# Patient Record
Sex: FEMALE | Race: WHITE | Employment: OTHER | URBAN - METROPOLITAN AREA
[De-identification: names, ages, dates, MRNs, and addresses within clinical notes are randomized per-mention and may not be internally consistent; named-entity substitution may affect disease eponyms.]

---

## 2020-04-02 ENCOUNTER — HOSPITAL ENCOUNTER (INPATIENT)
Facility: CLINIC | Age: 85
LOS: 2 days | Discharge: NURSING, REHAB OR LONG-TERM CARE | DRG: 039 | End: 2020-04-04
Attending: INTERNAL MEDICINE | Admitting: RADIOLOGY
Payer: MEDICARE

## 2020-04-02 ENCOUNTER — ANESTHESIA EVENT (OUTPATIENT)
Dept: SURGERY | Facility: CLINIC | Age: 85
DRG: 039 | End: 2020-04-02
Payer: MEDICARE

## 2020-04-02 ENCOUNTER — APPOINTMENT (OUTPATIENT)
Dept: INTERVENTIONAL RADIOLOGY/VASCULAR | Facility: CLINIC | Age: 85
DRG: 039 | End: 2020-04-02
Payer: MEDICARE

## 2020-04-02 ENCOUNTER — APPOINTMENT (OUTPATIENT)
Dept: GENERAL RADIOLOGY | Facility: CLINIC | Age: 85
DRG: 039 | End: 2020-04-02
Attending: INTERNAL MEDICINE
Payer: MEDICARE

## 2020-04-02 ENCOUNTER — ANESTHESIA (OUTPATIENT)
Dept: SURGERY | Facility: CLINIC | Age: 85
DRG: 039 | End: 2020-04-02
Payer: MEDICARE

## 2020-04-02 DIAGNOSIS — I67.1 DURAL ARTERIOVENOUS FISTULA: ICD-10-CM

## 2020-04-02 DIAGNOSIS — H57.12 LEFT EYE PAIN: ICD-10-CM

## 2020-04-02 DIAGNOSIS — I44.1 AV BLOCK, MOBITZ 2: ICD-10-CM

## 2020-04-02 DIAGNOSIS — I67.1: Primary | ICD-10-CM

## 2020-04-02 LAB
ABO + RH BLD: NORMAL
ABO + RH BLD: NORMAL
ALBUMIN SERPL-MCNC: 3.3 G/DL (ref 3.4–5)
ALP SERPL-CCNC: 69 U/L (ref 40–150)
ALT SERPL W P-5'-P-CCNC: 27 U/L (ref 0–50)
ANION GAP SERPL CALCULATED.3IONS-SCNC: 3 MMOL/L (ref 3–14)
APTT PPP: 30 SEC (ref 22–37)
APTT PPP: 68 SEC (ref 22–37)
APTT PPP: NORMAL SEC (ref 22–37)
AST SERPL W P-5'-P-CCNC: 33 U/L (ref 0–45)
BASOPHILS # BLD AUTO: 0 10E9/L (ref 0–0.2)
BASOPHILS NFR BLD AUTO: 0.4 %
BILIRUB SERPL-MCNC: 0.5 MG/DL (ref 0.2–1.3)
BLD GP AB SCN SERPL QL: NORMAL
BLOOD BANK CMNT PATIENT-IMP: NORMAL
BUN SERPL-MCNC: 23 MG/DL (ref 7–30)
CALCIUM SERPL-MCNC: 10 MG/DL (ref 8.5–10.1)
CHLORIDE SERPL-SCNC: 106 MMOL/L (ref 94–109)
CO2 SERPL-SCNC: 29 MMOL/L (ref 20–32)
CREAT SERPL-MCNC: 0.62 MG/DL (ref 0.52–1.04)
CREAT SERPL-MCNC: 0.62 MG/DL (ref 0.52–1.04)
DIFFERENTIAL METHOD BLD: NORMAL
EOSINOPHIL # BLD AUTO: 0.1 10E9/L (ref 0–0.7)
EOSINOPHIL NFR BLD AUTO: 1.4 %
ERYTHROCYTE [DISTWIDTH] IN BLOOD BY AUTOMATED COUNT: 13.2 % (ref 10–15)
ERYTHROCYTE [DISTWIDTH] IN BLOOD BY AUTOMATED COUNT: 13.5 % (ref 10–15)
GFR SERPL CREATININE-BSD FRML MDRD: 78 ML/MIN/{1.73_M2}
GFR SERPL CREATININE-BSD FRML MDRD: 78 ML/MIN/{1.73_M2}
GLUCOSE BLDC GLUCOMTR-MCNC: 110 MG/DL (ref 70–99)
GLUCOSE BLDC GLUCOMTR-MCNC: 123 MG/DL (ref 70–99)
GLUCOSE SERPL-MCNC: 107 MG/DL (ref 70–99)
HCT VFR BLD AUTO: 35.9 % (ref 35–47)
HCT VFR BLD AUTO: 39.3 % (ref 35–47)
HGB BLD-MCNC: 11.2 G/DL (ref 11.7–15.7)
HGB BLD-MCNC: 12.4 G/DL (ref 11.7–15.7)
IMM GRANULOCYTES # BLD: 0 10E9/L (ref 0–0.4)
IMM GRANULOCYTES NFR BLD: 0.4 %
INR PPP: 1.03 (ref 0.86–1.14)
INR PPP: 1.16 (ref 0.86–1.14)
INR PPP: NORMAL (ref 0.86–1.14)
LYMPHOCYTES # BLD AUTO: 2 10E9/L (ref 0.8–5.3)
LYMPHOCYTES NFR BLD AUTO: 27.1 %
MCH RBC QN AUTO: 29.6 PG (ref 26.5–33)
MCH RBC QN AUTO: 30 PG (ref 26.5–33)
MCHC RBC AUTO-ENTMCNC: 31.2 G/DL (ref 31.5–36.5)
MCHC RBC AUTO-ENTMCNC: 31.6 G/DL (ref 31.5–36.5)
MCV RBC AUTO: 95 FL (ref 78–100)
MCV RBC AUTO: 95 FL (ref 78–100)
MONOCYTES # BLD AUTO: 0.9 10E9/L (ref 0–1.3)
MONOCYTES NFR BLD AUTO: 11.9 %
MRSA DNA SPEC QL NAA+PROBE: NEGATIVE
NEUTROPHILS # BLD AUTO: 4.3 10E9/L (ref 1.6–8.3)
NEUTROPHILS NFR BLD AUTO: 58.8 %
NRBC # BLD AUTO: 0 10*3/UL
NRBC BLD AUTO-RTO: 0 /100
PLATELET # BLD AUTO: 163 10E9/L (ref 150–450)
PLATELET # BLD AUTO: 180 10E9/L (ref 150–450)
POTASSIUM SERPL-SCNC: 4 MMOL/L (ref 3.4–5.3)
PROT SERPL-MCNC: 6.9 G/DL (ref 6.8–8.8)
RADIOLOGIST FLAGS: NORMAL
RBC # BLD AUTO: 3.78 10E12/L (ref 3.8–5.2)
RBC # BLD AUTO: 4.14 10E12/L (ref 3.8–5.2)
SODIUM SERPL-SCNC: 138 MMOL/L (ref 133–144)
SPECIMEN EXP DATE BLD: NORMAL
SPECIMEN SOURCE: NORMAL
WBC # BLD AUTO: 7.4 10E9/L (ref 4–11)
WBC # BLD AUTO: 9.2 10E9/L (ref 4–11)

## 2020-04-02 PROCEDURE — 85610 PROTHROMBIN TIME: CPT | Performed by: RADIOLOGY

## 2020-04-02 PROCEDURE — 25000566 ZZH SEVOFLURANE, EA 15 MIN

## 2020-04-02 PROCEDURE — 25800030 ZZH RX IP 258 OP 636: Performed by: STUDENT IN AN ORGANIZED HEALTH CARE EDUCATION/TRAINING PROGRAM

## 2020-04-02 PROCEDURE — 85610 PROTHROMBIN TIME: CPT | Performed by: INTERNAL MEDICINE

## 2020-04-02 PROCEDURE — 99285 EMERGENCY DEPT VISIT HI MDM: CPT | Mod: 25 | Performed by: INTERNAL MEDICINE

## 2020-04-02 PROCEDURE — 27210742 ZZH CATH CR1

## 2020-04-02 PROCEDURE — C1760 CLOSURE DEV, VASC: HCPCS

## 2020-04-02 PROCEDURE — 25000128 H RX IP 250 OP 636: Performed by: STUDENT IN AN ORGANIZED HEALTH CARE EDUCATION/TRAINING PROGRAM

## 2020-04-02 PROCEDURE — 93005 ELECTROCARDIOGRAM TRACING: CPT | Performed by: INTERNAL MEDICINE

## 2020-04-02 PROCEDURE — C1769 GUIDE WIRE: HCPCS

## 2020-04-02 PROCEDURE — 27210732 ZZH ACCESSORY CR1

## 2020-04-02 PROCEDURE — 71000015 ZZH RECOVERY PHASE 1 LEVEL 2 EA ADDTL HR

## 2020-04-02 PROCEDURE — 87641 MR-STAPH DNA AMP PROBE: CPT | Performed by: STUDENT IN AN ORGANIZED HEALTH CARE EDUCATION/TRAINING PROGRAM

## 2020-04-02 PROCEDURE — 85730 THROMBOPLASTIN TIME PARTIAL: CPT | Performed by: RADIOLOGY

## 2020-04-02 PROCEDURE — 71045 X-RAY EXAM CHEST 1 VIEW: CPT

## 2020-04-02 PROCEDURE — 36224 PLACE CATH CAROTD ART: CPT | Mod: 50

## 2020-04-02 PROCEDURE — 85730 THROMBOPLASTIN TIME PARTIAL: CPT | Performed by: INTERNAL MEDICINE

## 2020-04-02 PROCEDURE — 86901 BLOOD TYPING SEROLOGIC RH(D): CPT | Performed by: INTERNAL MEDICINE

## 2020-04-02 PROCEDURE — 00000146 ZZHCL STATISTIC GLUCOSE BY METER IP

## 2020-04-02 PROCEDURE — 36415 COLL VENOUS BLD VENIPUNCTURE: CPT | Performed by: RADIOLOGY

## 2020-04-02 PROCEDURE — C1887 CATHETER, GUIDING: HCPCS

## 2020-04-02 PROCEDURE — 76377 3D RENDER W/INTRP POSTPROCES: CPT | Mod: XS

## 2020-04-02 PROCEDURE — 27210889 ZZH ACCESSORY CR8

## 2020-04-02 PROCEDURE — 85027 COMPLETE CBC AUTOMATED: CPT | Performed by: RADIOLOGY

## 2020-04-02 PROCEDURE — 25500064 ZZH RX 255 OP 636: Performed by: RADIOLOGY

## 2020-04-02 PROCEDURE — 80053 COMPREHEN METABOLIC PANEL: CPT | Performed by: INTERNAL MEDICINE

## 2020-04-02 PROCEDURE — 37000008 ZZH ANESTHESIA TECHNICAL FEE, 1ST 30 MIN

## 2020-04-02 PROCEDURE — 20000004 ZZH R&B ICU UMMC

## 2020-04-02 PROCEDURE — 86850 RBC ANTIBODY SCREEN: CPT | Performed by: INTERNAL MEDICINE

## 2020-04-02 PROCEDURE — 25000125 ZZHC RX 250: Performed by: STUDENT IN AN ORGANIZED HEALTH CARE EDUCATION/TRAINING PROGRAM

## 2020-04-02 PROCEDURE — 82565 ASSAY OF CREATININE: CPT | Performed by: RADIOLOGY

## 2020-04-02 PROCEDURE — 85025 COMPLETE CBC W/AUTO DIFF WBC: CPT | Performed by: INTERNAL MEDICINE

## 2020-04-02 PROCEDURE — 25000128 H RX IP 250 OP 636: Performed by: ANESTHESIOLOGY

## 2020-04-02 PROCEDURE — 87640 STAPH A DNA AMP PROBE: CPT | Performed by: STUDENT IN AN ORGANIZED HEALTH CARE EDUCATION/TRAINING PROGRAM

## 2020-04-02 PROCEDURE — 25000128 H RX IP 250 OP 636: Performed by: NURSE ANESTHETIST, CERTIFIED REGISTERED

## 2020-04-02 PROCEDURE — 40000170 ZZH STATISTIC PRE-PROCEDURE ASSESSMENT II

## 2020-04-02 PROCEDURE — 71000014 ZZH RECOVERY PHASE 1 LEVEL 2 FIRST HR

## 2020-04-02 PROCEDURE — 27210888 ZZH ACCESSORY CR7

## 2020-04-02 PROCEDURE — 86900 BLOOD TYPING SEROLOGIC ABO: CPT | Performed by: INTERNAL MEDICINE

## 2020-04-02 PROCEDURE — 37000009 ZZH ANESTHESIA TECHNICAL FEE, EACH ADDTL 15 MIN

## 2020-04-02 PROCEDURE — 93010 ELECTROCARDIOGRAM REPORT: CPT | Mod: Z6 | Performed by: INTERNAL MEDICINE

## 2020-04-02 PROCEDURE — 25000125 ZZHC RX 250: Performed by: RADIOLOGY

## 2020-04-02 PROCEDURE — 27210895 ZZH CATH CR8

## 2020-04-02 PROCEDURE — 27210738 ZZH ACCESSORY CR2

## 2020-04-02 PROCEDURE — 27210804 ZZH SHEATH CR3

## 2020-04-02 RX ORDER — HYDROMORPHONE HYDROCHLORIDE 1 MG/ML
.3-.5 INJECTION, SOLUTION INTRAMUSCULAR; INTRAVENOUS; SUBCUTANEOUS EVERY 5 MIN PRN
Status: DISCONTINUED | OUTPATIENT
Start: 2020-04-02 | End: 2020-04-02

## 2020-04-02 RX ORDER — HEPARIN SODIUM 1000 [USP'U]/ML
INJECTION, SOLUTION INTRAVENOUS; SUBCUTANEOUS PRN
Status: DISCONTINUED | OUTPATIENT
Start: 2020-04-02 | End: 2020-04-02

## 2020-04-02 RX ORDER — PRAVASTATIN SODIUM 20 MG
20 TABLET ORAL AT BEDTIME
Status: DISCONTINUED | OUTPATIENT
Start: 2020-04-02 | End: 2020-04-04 | Stop reason: HOSPADM

## 2020-04-02 RX ORDER — DORZOLAMIDE HYDROCHLORIDE AND TIMOLOL MALEATE 20; 5 MG/ML; MG/ML
1 SOLUTION/ DROPS OPHTHALMIC 2 TIMES DAILY
Status: DISCONTINUED | OUTPATIENT
Start: 2020-04-02 | End: 2020-04-04 | Stop reason: HOSPADM

## 2020-04-02 RX ORDER — NICOTINE POLACRILEX 4 MG
15-30 LOZENGE BUCCAL
Status: DISCONTINUED | OUTPATIENT
Start: 2020-04-02 | End: 2020-04-04 | Stop reason: HOSPADM

## 2020-04-02 RX ORDER — METOPROLOL TARTRATE 50 MG
50 TABLET ORAL 2 TIMES DAILY
Status: DISCONTINUED | OUTPATIENT
Start: 2020-04-02 | End: 2020-04-04 | Stop reason: HOSPADM

## 2020-04-02 RX ORDER — FENTANYL CITRATE 50 UG/ML
INJECTION, SOLUTION INTRAMUSCULAR; INTRAVENOUS PRN
Status: DISCONTINUED | OUTPATIENT
Start: 2020-04-02 | End: 2020-04-02

## 2020-04-02 RX ORDER — SODIUM CHLORIDE, SODIUM LACTATE, POTASSIUM CHLORIDE, CALCIUM CHLORIDE 600; 310; 30; 20 MG/100ML; MG/100ML; MG/100ML; MG/100ML
INJECTION, SOLUTION INTRAVENOUS CONTINUOUS PRN
Status: DISCONTINUED | OUTPATIENT
Start: 2020-04-02 | End: 2020-04-02

## 2020-04-02 RX ORDER — LIDOCAINE HYDROCHLORIDE 20 MG/ML
INJECTION, SOLUTION INFILTRATION; PERINEURAL PRN
Status: DISCONTINUED | OUTPATIENT
Start: 2020-04-02 | End: 2020-04-02

## 2020-04-02 RX ORDER — SODIUM CHLORIDE 9 MG/ML
INJECTION, SOLUTION INTRAVENOUS CONTINUOUS
Status: DISCONTINUED | OUTPATIENT
Start: 2020-04-02 | End: 2020-04-04 | Stop reason: HOSPADM

## 2020-04-02 RX ORDER — DORZOLAMIDE HYDROCHLORIDE AND TIMOLOL MALEATE 20; 5 MG/ML; MG/ML
1 SOLUTION/ DROPS OPHTHALMIC 2 TIMES DAILY
COMMUNITY

## 2020-04-02 RX ORDER — FENTANYL CITRATE 50 UG/ML
25-50 INJECTION, SOLUTION INTRAMUSCULAR; INTRAVENOUS EVERY 5 MIN PRN
Status: DISCONTINUED | OUTPATIENT
Start: 2020-04-02 | End: 2020-04-02

## 2020-04-02 RX ORDER — NALOXONE HYDROCHLORIDE 0.4 MG/ML
.1-.4 INJECTION, SOLUTION INTRAMUSCULAR; INTRAVENOUS; SUBCUTANEOUS
Status: DISCONTINUED | OUTPATIENT
Start: 2020-04-02 | End: 2020-04-04 | Stop reason: HOSPADM

## 2020-04-02 RX ORDER — METOCLOPRAMIDE 5 MG/1
5 TABLET ORAL EVERY 6 HOURS PRN
Status: DISCONTINUED | OUTPATIENT
Start: 2020-04-02 | End: 2020-04-04 | Stop reason: HOSPADM

## 2020-04-02 RX ORDER — PROCHLORPERAZINE MALEATE 5 MG
5 TABLET ORAL EVERY 6 HOURS PRN
Status: DISCONTINUED | OUTPATIENT
Start: 2020-04-02 | End: 2020-04-04 | Stop reason: HOSPADM

## 2020-04-02 RX ORDER — DEXAMETHASONE SODIUM PHOSPHATE 4 MG/ML
INJECTION, SOLUTION INTRA-ARTICULAR; INTRALESIONAL; INTRAMUSCULAR; INTRAVENOUS; SOFT TISSUE PRN
Status: DISCONTINUED | OUTPATIENT
Start: 2020-04-02 | End: 2020-04-02

## 2020-04-02 RX ORDER — ONDANSETRON 4 MG/1
4 TABLET, ORALLY DISINTEGRATING ORAL EVERY 30 MIN PRN
Status: DISCONTINUED | OUTPATIENT
Start: 2020-04-02 | End: 2020-04-02

## 2020-04-02 RX ORDER — PRAVASTATIN SODIUM 20 MG
20 TABLET ORAL AT BEDTIME
COMMUNITY

## 2020-04-02 RX ORDER — ONDANSETRON 2 MG/ML
4 INJECTION INTRAMUSCULAR; INTRAVENOUS EVERY 6 HOURS PRN
Status: DISCONTINUED | OUTPATIENT
Start: 2020-04-02 | End: 2020-04-04 | Stop reason: HOSPADM

## 2020-04-02 RX ORDER — GABAPENTIN 100 MG/1
100 CAPSULE ORAL DAILY
Status: DISCONTINUED | OUTPATIENT
Start: 2020-04-03 | End: 2020-04-04 | Stop reason: HOSPADM

## 2020-04-02 RX ORDER — IODIXANOL 320 MG/ML
150 INJECTION, SOLUTION INTRAVASCULAR ONCE
Status: COMPLETED | OUTPATIENT
Start: 2020-04-02 | End: 2020-04-02

## 2020-04-02 RX ORDER — POLYETHYLENE GLYCOL 3350 17 G/17G
17 POWDER, FOR SOLUTION ORAL DAILY PRN
COMMUNITY

## 2020-04-02 RX ORDER — FENTANYL CITRATE 50 UG/ML
25-50 INJECTION, SOLUTION INTRAMUSCULAR; INTRAVENOUS
Status: DISCONTINUED | OUTPATIENT
Start: 2020-04-02 | End: 2020-04-02 | Stop reason: HOSPADM

## 2020-04-02 RX ORDER — PREDNISOLONE ACETATE 10 MG/ML
1 SUSPENSION/ DROPS OPHTHALMIC 2 TIMES DAILY
Status: DISCONTINUED | OUTPATIENT
Start: 2020-04-02 | End: 2020-04-04 | Stop reason: HOSPADM

## 2020-04-02 RX ORDER — METOPROLOL TARTRATE 50 MG
50 TABLET ORAL 2 TIMES DAILY
COMMUNITY

## 2020-04-02 RX ORDER — LATANOPROST 50 UG/ML
1 SOLUTION/ DROPS OPHTHALMIC 2 TIMES DAILY
COMMUNITY

## 2020-04-02 RX ORDER — ATROPINE SULFATE 10 MG/ML
1 SOLUTION/ DROPS OPHTHALMIC 2 TIMES DAILY
Status: DISCONTINUED | OUTPATIENT
Start: 2020-04-02 | End: 2020-04-04 | Stop reason: HOSPADM

## 2020-04-02 RX ORDER — GABAPENTIN 100 MG/1
100 CAPSULE ORAL DAILY
COMMUNITY

## 2020-04-02 RX ORDER — METOCLOPRAMIDE HYDROCHLORIDE 5 MG/ML
5 INJECTION INTRAMUSCULAR; INTRAVENOUS EVERY 6 HOURS PRN
Status: DISCONTINUED | OUTPATIENT
Start: 2020-04-02 | End: 2020-04-04 | Stop reason: HOSPADM

## 2020-04-02 RX ORDER — SODIUM CHLORIDE, SODIUM LACTATE, POTASSIUM CHLORIDE, CALCIUM CHLORIDE 600; 310; 30; 20 MG/100ML; MG/100ML; MG/100ML; MG/100ML
INJECTION, SOLUTION INTRAVENOUS CONTINUOUS
Status: DISCONTINUED | OUTPATIENT
Start: 2020-04-02 | End: 2020-04-02 | Stop reason: HOSPADM

## 2020-04-02 RX ORDER — EPTIFIBATIDE 2 MG/ML
133 INJECTION, SOLUTION INTRAVENOUS ONCE
Status: COMPLETED | OUTPATIENT
Start: 2020-04-02 | End: 2020-04-02

## 2020-04-02 RX ORDER — ATROPINE SULFATE 10 MG/ML
1 SOLUTION/ DROPS OPHTHALMIC 2 TIMES DAILY
COMMUNITY

## 2020-04-02 RX ORDER — PROCHLORPERAZINE 25 MG
12.5 SUPPOSITORY, RECTAL RECTAL EVERY 12 HOURS PRN
Status: DISCONTINUED | OUTPATIENT
Start: 2020-04-02 | End: 2020-04-04 | Stop reason: HOSPADM

## 2020-04-02 RX ORDER — NICOTINE POLACRILEX 4 MG
15-30 LOZENGE BUCCAL
Status: DISCONTINUED | OUTPATIENT
Start: 2020-04-02 | End: 2020-04-02

## 2020-04-02 RX ORDER — DEXTROSE MONOHYDRATE 25 G/50ML
25-50 INJECTION, SOLUTION INTRAVENOUS
Status: DISCONTINUED | OUTPATIENT
Start: 2020-04-02 | End: 2020-04-02

## 2020-04-02 RX ORDER — NALOXONE HYDROCHLORIDE 0.4 MG/ML
.1-.4 INJECTION, SOLUTION INTRAMUSCULAR; INTRAVENOUS; SUBCUTANEOUS
Status: DISCONTINUED | OUTPATIENT
Start: 2020-04-02 | End: 2020-04-02

## 2020-04-02 RX ORDER — LIDOCAINE HYDROCHLORIDE 10 MG/ML
1-30 INJECTION, SOLUTION EPIDURAL; INFILTRATION; INTRACAUDAL; PERINEURAL
Status: COMPLETED | OUTPATIENT
Start: 2020-04-02 | End: 2020-04-02

## 2020-04-02 RX ORDER — PREDNISOLONE ACETATE 10 MG/ML
1 SUSPENSION/ DROPS OPHTHALMIC 2 TIMES DAILY
COMMUNITY

## 2020-04-02 RX ORDER — ONDANSETRON 2 MG/ML
4 INJECTION INTRAMUSCULAR; INTRAVENOUS EVERY 30 MIN PRN
Status: DISCONTINUED | OUTPATIENT
Start: 2020-04-02 | End: 2020-04-02

## 2020-04-02 RX ORDER — ACETAMINOPHEN 500 MG
500 TABLET ORAL EVERY 6 HOURS PRN
Status: DISCONTINUED | OUTPATIENT
Start: 2020-04-02 | End: 2020-04-04 | Stop reason: HOSPADM

## 2020-04-02 RX ORDER — DEXTROSE MONOHYDRATE 25 G/50ML
25-50 INJECTION, SOLUTION INTRAVENOUS
Status: DISCONTINUED | OUTPATIENT
Start: 2020-04-02 | End: 2020-04-04 | Stop reason: HOSPADM

## 2020-04-02 RX ORDER — METOPROLOL TARTRATE 1 MG/ML
1-2 INJECTION, SOLUTION INTRAVENOUS EVERY 5 MIN PRN
Status: DISCONTINUED | OUTPATIENT
Start: 2020-04-02 | End: 2020-04-02

## 2020-04-02 RX ORDER — PROPOFOL 10 MG/ML
INJECTION, EMULSION INTRAVENOUS PRN
Status: DISCONTINUED | OUTPATIENT
Start: 2020-04-02 | End: 2020-04-02

## 2020-04-02 RX ORDER — POLYETHYLENE GLYCOL 3350 17 G/17G
17 POWDER, FOR SOLUTION ORAL DAILY PRN
Status: DISCONTINUED | OUTPATIENT
Start: 2020-04-02 | End: 2020-04-04 | Stop reason: HOSPADM

## 2020-04-02 RX ORDER — ONDANSETRON 2 MG/ML
4 INJECTION INTRAMUSCULAR; INTRAVENOUS EVERY 30 MIN PRN
Status: DISCONTINUED | OUTPATIENT
Start: 2020-04-02 | End: 2020-04-02 | Stop reason: HOSPADM

## 2020-04-02 RX ORDER — HYDROCODONE BITARTRATE AND ACETAMINOPHEN 5; 325 MG/1; MG/1
0.5 TABLET ORAL EVERY 6 HOURS PRN
Status: ON HOLD | COMMUNITY
Start: 2020-03-30 | End: 2020-04-04

## 2020-04-02 RX ORDER — ACETAMINOPHEN 500 MG
500 TABLET ORAL EVERY 6 HOURS PRN
COMMUNITY

## 2020-04-02 RX ORDER — SALIVA STIMULANT COMB. NO.3
1 SPRAY, NON-AEROSOL (ML) MUCOUS MEMBRANE EVERY 6 HOURS PRN
COMMUNITY

## 2020-04-02 RX ORDER — EPTIFIBATIDE 2 MG/ML
2 INJECTION, SOLUTION INTRAVENOUS CONTINUOUS
Status: DISCONTINUED | OUTPATIENT
Start: 2020-04-02 | End: 2020-04-04 | Stop reason: HOSPADM

## 2020-04-02 RX ORDER — LOSARTAN POTASSIUM 100 MG/1
100 TABLET ORAL DAILY
Status: DISCONTINUED | OUTPATIENT
Start: 2020-04-02 | End: 2020-04-04 | Stop reason: HOSPADM

## 2020-04-02 RX ORDER — ONDANSETRON 4 MG/1
4 TABLET, ORALLY DISINTEGRATING ORAL EVERY 30 MIN PRN
Status: DISCONTINUED | OUTPATIENT
Start: 2020-04-02 | End: 2020-04-02 | Stop reason: HOSPADM

## 2020-04-02 RX ORDER — ONDANSETRON 4 MG/1
4 TABLET, ORALLY DISINTEGRATING ORAL EVERY 6 HOURS PRN
Status: DISCONTINUED | OUTPATIENT
Start: 2020-04-02 | End: 2020-04-04 | Stop reason: HOSPADM

## 2020-04-02 RX ORDER — LATANOPROST 50 UG/ML
1 SOLUTION/ DROPS OPHTHALMIC 2 TIMES DAILY
Status: DISCONTINUED | OUTPATIENT
Start: 2020-04-02 | End: 2020-04-04 | Stop reason: HOSPADM

## 2020-04-02 RX ORDER — HEPARIN SODIUM 200 [USP'U]/100ML
1 INJECTION, SOLUTION INTRAVENOUS CONTINUOUS PRN
Status: DISCONTINUED | OUTPATIENT
Start: 2020-04-02 | End: 2020-04-04 | Stop reason: HOSPADM

## 2020-04-02 RX ORDER — ASPIRIN 81 MG/1
81 TABLET ORAL AT BEDTIME
Status: DISCONTINUED | OUTPATIENT
Start: 2020-04-02 | End: 2020-04-04 | Stop reason: HOSPADM

## 2020-04-02 RX ORDER — SODIUM CHLORIDE, SODIUM LACTATE, POTASSIUM CHLORIDE, CALCIUM CHLORIDE 600; 310; 30; 20 MG/100ML; MG/100ML; MG/100ML; MG/100ML
INJECTION, SOLUTION INTRAVENOUS CONTINUOUS
Status: DISCONTINUED | OUTPATIENT
Start: 2020-04-02 | End: 2020-04-04 | Stop reason: HOSPADM

## 2020-04-02 RX ORDER — SALIVA STIMULANT COMB. NO.3
1 SPRAY, NON-AEROSOL (ML) MUCOUS MEMBRANE EVERY 6 HOURS PRN
Status: DISCONTINUED | OUTPATIENT
Start: 2020-04-02 | End: 2020-04-04 | Stop reason: HOSPADM

## 2020-04-02 RX ORDER — LIDOCAINE 40 MG/G
CREAM TOPICAL
Status: DISCONTINUED | OUTPATIENT
Start: 2020-04-02 | End: 2020-04-04 | Stop reason: HOSPADM

## 2020-04-02 RX ORDER — LOSARTAN POTASSIUM 100 MG/1
100 TABLET ORAL DAILY
COMMUNITY

## 2020-04-02 RX ORDER — HEPARIN SODIUM 1000 [USP'U]/ML
INJECTION, SOLUTION INTRAVENOUS; SUBCUTANEOUS CONTINUOUS PRN
Status: DISCONTINUED | OUTPATIENT
Start: 2020-04-02 | End: 2020-04-02

## 2020-04-02 RX ADMIN — FENTANYL CITRATE 25 MCG: 50 INJECTION, SOLUTION INTRAMUSCULAR; INTRAVENOUS at 17:28

## 2020-04-02 RX ADMIN — SODIUM CHLORIDE, POTASSIUM CHLORIDE, SODIUM LACTATE AND CALCIUM CHLORIDE: 600; 310; 30; 20 INJECTION, SOLUTION INTRAVENOUS at 22:11

## 2020-04-02 RX ADMIN — SODIUM CHLORIDE, POTASSIUM CHLORIDE, SODIUM LACTATE AND CALCIUM CHLORIDE: 600; 310; 30; 20 INJECTION, SOLUTION INTRAVENOUS at 11:37

## 2020-04-02 RX ADMIN — FENTANYL CITRATE 25 MCG: 50 INJECTION, SOLUTION INTRAMUSCULAR; INTRAVENOUS at 15:04

## 2020-04-02 RX ADMIN — SODIUM CHLORIDE, POTASSIUM CHLORIDE, SODIUM LACTATE AND CALCIUM CHLORIDE 100 ML/HR: 600; 310; 30; 20 INJECTION, SOLUTION INTRAVENOUS at 18:30

## 2020-04-02 RX ADMIN — ROCURONIUM BROMIDE 10 MG: 10 INJECTION INTRAVENOUS at 15:00

## 2020-04-02 RX ADMIN — EPTIFIBATIDE 8 MG: 2 INJECTION, SOLUTION INTRAVENOUS at 15:36

## 2020-04-02 RX ADMIN — PHENYLEPHRINE HYDROCHLORIDE 150 MCG: 10 INJECTION INTRAVENOUS at 12:58

## 2020-04-02 RX ADMIN — ROCURONIUM BROMIDE 20 MG: 10 INJECTION INTRAVENOUS at 13:56

## 2020-04-02 RX ADMIN — LIDOCAINE HYDROCHLORIDE 60 MG: 20 INJECTION, SOLUTION INFILTRATION; PERINEURAL at 12:00

## 2020-04-02 RX ADMIN — FENTANYL CITRATE 25 MCG: 50 INJECTION INTRAMUSCULAR; INTRAVENOUS at 19:05

## 2020-04-02 RX ADMIN — PHENYLEPHRINE HYDROCHLORIDE 100 MCG: 10 INJECTION INTRAVENOUS at 13:21

## 2020-04-02 RX ADMIN — FENTANYL CITRATE 100 MCG: 50 INJECTION, SOLUTION INTRAMUSCULAR; INTRAVENOUS at 12:00

## 2020-04-02 RX ADMIN — SUGAMMADEX 120 MG: 100 INJECTION, SOLUTION INTRAVENOUS at 17:40

## 2020-04-02 RX ADMIN — ROCURONIUM BROMIDE 10 MG: 10 INJECTION INTRAVENOUS at 16:32

## 2020-04-02 RX ADMIN — PHENYLEPHRINE HYDROCHLORIDE 100 MCG: 10 INJECTION INTRAVENOUS at 12:32

## 2020-04-02 RX ADMIN — DEXAMETHASONE SODIUM PHOSPHATE 4 MG: 4 INJECTION, SOLUTION INTRA-ARTICULAR; INTRALESIONAL; INTRAMUSCULAR; INTRAVENOUS; SOFT TISSUE at 12:24

## 2020-04-02 RX ADMIN — HEPARIN SODIUM 1000 UNITS: 1000 INJECTION INTRAVENOUS; SUBCUTANEOUS at 13:34

## 2020-04-02 RX ADMIN — SODIUM CHLORIDE, POTASSIUM CHLORIDE, SODIUM LACTATE AND CALCIUM CHLORIDE: 600; 310; 30; 20 INJECTION, SOLUTION INTRAVENOUS at 13:45

## 2020-04-02 RX ADMIN — SUGAMMADEX 80 MG: 100 INJECTION, SOLUTION INTRAVENOUS at 17:45

## 2020-04-02 RX ADMIN — HEPARIN SODIUM 14 BAG: 200 INJECTION, SOLUTION INTRAVENOUS at 17:34

## 2020-04-02 RX ADMIN — IODIXANOL 200 ML: 320 INJECTION, SOLUTION INTRAVASCULAR at 17:33

## 2020-04-02 RX ADMIN — HEPARIN SODIUM 7 BAG: 200 INJECTION, SOLUTION INTRAVENOUS at 12:43

## 2020-04-02 RX ADMIN — PHENYLEPHRINE HYDROCHLORIDE 100 MCG: 10 INJECTION INTRAVENOUS at 13:11

## 2020-04-02 RX ADMIN — FENTANYL CITRATE 25 MCG: 50 INJECTION, SOLUTION INTRAMUSCULAR; INTRAVENOUS at 16:32

## 2020-04-02 RX ADMIN — PHENYLEPHRINE HYDROCHLORIDE 100 MCG: 10 INJECTION INTRAVENOUS at 12:41

## 2020-04-02 RX ADMIN — HEPARIN SODIUM 6000 UNITS: 1000 INJECTION INTRAVENOUS; SUBCUTANEOUS at 12:33

## 2020-04-02 RX ADMIN — ROCURONIUM BROMIDE 50 MG: 10 INJECTION INTRAVENOUS at 12:00

## 2020-04-02 RX ADMIN — ROCURONIUM BROMIDE 10 MG: 10 INJECTION INTRAVENOUS at 16:07

## 2020-04-02 RX ADMIN — PHENYLEPHRINE HYDROCHLORIDE 200 MCG: 10 INJECTION INTRAVENOUS at 12:11

## 2020-04-02 RX ADMIN — PROPOFOL 20 MG: 10 INJECTION, EMULSION INTRAVENOUS at 17:34

## 2020-04-02 RX ADMIN — EPTIFIBATIDE 1 MCG/KG/MIN: 200 INJECTION INTRAVENOUS at 15:53

## 2020-04-02 RX ADMIN — FENTANYL CITRATE 25 MCG: 50 INJECTION INTRAMUSCULAR; INTRAVENOUS at 19:58

## 2020-04-02 RX ADMIN — PHENYLEPHRINE HYDROCHLORIDE 50 MCG: 10 INJECTION INTRAVENOUS at 12:22

## 2020-04-02 RX ADMIN — FENTANYL CITRATE 25 MCG: 50 INJECTION INTRAMUSCULAR; INTRAVENOUS at 18:51

## 2020-04-02 RX ADMIN — LIDOCAINE HYDROCHLORIDE 10 ML: 10 INJECTION, SOLUTION EPIDURAL; INFILTRATION; INTRACAUDAL; PERINEURAL at 13:12

## 2020-04-02 RX ADMIN — PHENYLEPHRINE HYDROCHLORIDE 100 MCG: 10 INJECTION INTRAVENOUS at 13:45

## 2020-04-02 RX ADMIN — FENTANYL CITRATE 25 MCG: 50 INJECTION INTRAMUSCULAR; INTRAVENOUS at 18:23

## 2020-04-02 RX ADMIN — HEPARIN SODIUM 1000 UNITS: 1000 INJECTION INTRAVENOUS; SUBCUTANEOUS at 15:08

## 2020-04-02 RX ADMIN — FENTANYL CITRATE 25 MCG: 50 INJECTION INTRAMUSCULAR; INTRAVENOUS at 19:47

## 2020-04-02 RX ADMIN — PROPOFOL 70 MG: 10 INJECTION, EMULSION INTRAVENOUS at 12:00

## 2020-04-02 RX ADMIN — PHENYLEPHRINE HYDROCHLORIDE 100 MCG: 10 INJECTION INTRAVENOUS at 12:50

## 2020-04-02 RX ADMIN — PROPOFOL 30 MG: 10 INJECTION, EMULSION INTRAVENOUS at 13:56

## 2020-04-02 ASSESSMENT — ENCOUNTER SYMPTOMS
COLOR CHANGE: 0
CONFUSION: 0
ABDOMINAL PAIN: 0
FEVER: 0
NUMBNESS: 0
EYE PAIN: 1
HEADACHES: 0
NECK STIFFNESS: 0
SHORTNESS OF BREATH: 0
ARTHRALGIAS: 0
EYE REDNESS: 0
DIFFICULTY URINATING: 0

## 2020-04-02 ASSESSMENT — MIFFLIN-ST. JEOR: SCORE: 1011.43

## 2020-04-02 NOTE — PHARMACY-ADMISSION MEDICATION HISTORY
Admission medication history interview status for the 4/2/2020 admission is complete. See Epic admission navigator for allergy information, pharmacy, prior to admission medications and immunization status.     Medication history interview sources:  [facility]    Changes made to PTA medication list (reason)  Added:   -everything    Additional medication history information (including reliability of information, actions taken by pharmacist):None      Prior to Admission medications    Medication Sig Last Dose Taking? Auth Provider   acetaminophen (TYLENOL) 500 MG tablet Take 500 mg by mouth every 6 hours as needed for mild pain 4/1/2020 at 2200 Yes Unknown, Entered By History   aspirin (ASA) 81 MG EC tablet Take 81 mg by mouth At Bedtime 4/1/2020 at 2000 Yes Unknown, Entered By History   atropine 1 % ophthalmic solution Place 1 drop Into the left eye 2 times daily 4/2/2020 at 0800 Yes Unknown, Entered By History   calcium carbonate-vitamin D 600-200 MG-UNIT CAPS Take 1 capsule by mouth daily 4/1/2020 at 0730 Yes Unknown, Entered By History   dorzolamide-timolol (COSOPT) 2-0.5 % ophthalmic solution Place 1 drop Into the left eye 2 times daily 4/2/2020 at 0800 Yes Unknown, Entered By History   gabapentin (NEURONTIN) 100 MG capsule Take 100 mg by mouth daily CURRENTLY ON HOLD STARTING 4/2/2020  Yes Unknown, Entered By History   HYDROcodone-acetaminophen (NORCO) 5-325 MG tablet Take 0.5 tablets by mouth every 6 hours as needed for severe pain 4/2/2020 at 0600 Yes Unknown, Entered By History   latanoprost (XALATAN) 0.005 % ophthalmic solution Place 1 drop Into the left eye 2 times daily 4/2/2020 at 0800 Yes Unknown, Entered By History   losartan (COZAAR) 100 MG tablet Take 100 mg by mouth daily CURRENTLY ON HOLD STARTING 4/2/2020 4/1/2020 at 0730 Yes Unknown, Entered By History   metoprolol tartrate (LOPRESSOR) 50 MG tablet Take 50 mg by mouth 2 times daily 4/1/2020 at 2000 Yes Unknown, Entered By  History   pravastatin (PRAVACHOL) 20 MG tablet Take 20 mg by mouth At Bedtime 4/1/2020 at 2000 Yes Unknown, Entered By History   prednisoLONE acetate (PRED FORTE) 1 % ophthalmic suspension Place 1 drop Into the left eye 2 times daily 4/2/2020 at 0800 Yes Unknown, Entered By History   sertraline (ZOLOFT) 50 MG tablet Take 50 mg by mouth daily 4/2/2020 at 0730 Yes Unknown, Entered By History   artificial saliva (BIOTENE MT) SOLN solution Swish and spit 1 spray in mouth every 6 hours as needed for dry mouth Unknown at Unknown time  Unknown, Entered By History   polyethylene glycol (MIRALAX) packet Take 17 g by mouth daily as needed for constipation Unknown at Unknown time  Unknown, Entered By History         Medication history completed by: Kyaw Casas, Pharm.D.

## 2020-04-02 NOTE — ANESTHESIA PREPROCEDURE EVALUATION
"Anesthesia Pre-Procedure Evaluation    Patient: Rhonda Padron   MRN:     [id number] Gender:   female   Age:    89 year old :      1927        Preoperative Diagnosis: Abnormal angiogram [R93.89]   Procedure(s):  Angiogram in interventional radiology     LABS:  CBC:   Lab Results   Component Value Date    WBC 7.4 2020    HGB 12.4 2020    HCT 39.3 2020     2020     BMP:   Lab Results   Component Value Date     2020    POTASSIUM 4.0 2020    CHLORIDE 106 2020    CO2 29 2020    BUN 23 2020    CR 0.62 2020     (H) 2020     COAGS:   Lab Results   Component Value Date    PTT 30 2020    INR 1.03 2020     POC: No results found for: BGM, HCG, HCGS  OTHER:   Lab Results   Component Value Date    CANDIS 10.0 2020    ALBUMIN 3.3 (L) 2020    PROTTOTAL 6.9 2020    ALT 27 2020    AST 33 2020    ALKPHOS 69 2020    BILITOTAL 0.5 2020        Preop Vitals    BP Readings from Last 3 Encounters:   20 (!) 157/72    Pulse Readings from Last 3 Encounters:   20 (!) 40      Resp Readings from Last 3 Encounters:   20 14    SpO2 Readings from Last 3 Encounters:   20 97%      Temp Readings from Last 1 Encounters:   20 36.4  C (97.5  F) (Oral)    Ht Readings from Last 1 Encounters:   20 1.676 m (5' 6\")      Wt Readings from Last 1 Encounters:   20 59 kg (130 lb)    Estimated body mass index is 20.98 kg/m  as calculated from the following:    Height as of this encounter: 1.676 m (5' 6\").    Weight as of this encounter: 59 kg (130 lb).     LDA:  Peripheral IV 20 Right Upper arm (Active)   Site Assessment WDL 20 1100   Line Status Saline locked 20 1100   Phlebitis Scale 0-->no symptoms 20 1100   Infiltration Scale 0 20 1100   Number of days: 0       ETT (Active)   Number of days: 0        History reviewed. No pertinent past medical " history.   History reviewed. No pertinent surgical history.   Allergies   Allergen Reactions     Avelox [Moxifloxacin]      Cefdinir      Codeine      Latex      Omeprazole      Tramadol      Zofran [Ondansetron]         Anesthesia Evaluation     .             ROS/MED HX    ENT/Pulmonary: Comment: Bronchiectasis      Neurologic: Comment: R carotid cavernous fistula    (+)neuropathy     Cardiovascular:     (+) Dyslipidemia, hypertension----. : . . . :. .       METS/Exercise Tolerance:     Hematologic:         Musculoskeletal:         GI/Hepatic:  - neg GI/hepatic ROS       Renal/Genitourinary:         Endo:  - neg endo ROS       Psychiatric:         Infectious Disease:         Malignancy:         Other:                         PHYSICAL EXAM:   Mental Status/Neuro: A/A/O   Airway: Facies: Feasible  Mallampati: II  Mouth/Opening: Full  TM distance: > 6 cm  Neck ROM: Limited   Respiratory: Auscultation: CTAB     Resp. Rate: Normal     Resp. Effort: Normal      CV: Rhythm: Regular  Heart: Normal Sounds   Comments:      Dental: Details                  Assessment:   ASA SCORE: 3    H&P: History and physical reviewed and following examination; no interval change.   Smoking Status:  Non-Smoker/Unknown        Plan:   Anes. Type:  General   Pre-Medication: None   Induction:  IV (Standard)   Airway: ETT; Oral   Access/Monitoring: PIV; 2nd PIV   Maintenance: Balanced     Postop Plan:   Postop Pain: Opioids  Postop Sedation/Airway: Not planned  Disposition: Outpatient     PONV Management:   Adult Risk Factors: Female, Non-Smoker, Postop Opioids     CONSENT: Direct conversation   Plan and risks discussed with: Patient   Blood Products: Consent Deferred (Minimal Blood Loss)                   Nikko Diez MD

## 2020-04-02 NOTE — PROCEDURES
General acute hospital, [place]     Endovascular Surgical Neuroradiology Post-Procedure Note    Pre-Procedure Diagnosis:  Right sided carotid cavernous fistula  Post-Procedure Diagnosis:  Right carotid cavernous fistula treated with flow diversion.    Procedure(s):   Treatment of carotid cavernous fistula.    Findings:  The right carotid cavernous fistula was treated with flow diversion in the right carotid cavernous artery. Multiple attempts were made to treat via venous access but the cavernous sinus could not be accessed. Please refer to detailed dictated report.    Plan:  Admit to ICU for observation overnight. Continue integrellin drip overlap with oral antiplatelet.    Primary Surgeon:  Dr. Darron Galeana  Secondary Surgeon:  Not applicable  Secondary Surgeon Review:  None  Fellow:  Kilo Dyson Ladd  Additional Assistants:  None    Prior to the start of the procedure and with procedural staff participation, I verbally confirmed: the patient s identity using two indicators, relevant allergies, that the procedure was appropriate and matched the consent or emergent situation, and that the correct equipment/implants were available. Immediately prior to starting the procedure I conducted the Time Out with the procedural staff and re-confirmed the patient s name, procedure, and site/side. (The Joint Commission universal protocol was followed.)  Yes    PRU value: Not applicable    Anesthesia:  Performed by Anesthesia  Medications:  Eptifibatide  Puncture site:  Left Femoral Artery, right and left femoral veins    Fluoroscopy time (minutes):  289.3  Radiation dose (mGy):  3519    Contrast amount (mL):  200     Estimated blood loss (mL):  Minimal    Closure:  Device right femoral vein -starclose, left femoral vein- 6F exoseal , left femoral artery starclose.    Disposition:  Will be followed in hospital by the Neuro Critical Care/Stroke team.        Sedation Post-Procedure Summary    Please  refer to detail note by anesthesia.      Masood Boateng MD  Pager:  [phone].

## 2020-04-02 NOTE — PROGRESS NOTES
General acute hospital, [facility]     Endovascular Surgical Neuroradiology Pre-Procedure Note      HPI:  Rhonda Padron is a 89 year old female with a diagnosis of right carotid cavernous fistula . She underwent a cerebral angiogram in [place] and is here for further management.  She has a history of fall last October after which she initially noticed the symptoms with gradual decreased visual equity in the left eye.  This has since progressed.  There is swelling over her left eye.  Her right initially had good vision however it has deteriorated significantly over the past couple of months.  She is independent at baseline and was able to do all her activities up until a few weeks earlier.  Currently she has visual symptoms apart from that is neurologically intact.  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    Medical History:  History reviewed. No pertinent past medical history.    Surgical History:  History reviewed. No pertinent surgical history.    Family History:  No family history on file.    Social History:  Social History     Socioeconomic History     Marital status:      Spouse name: Not on file     Number of children: Not on file     Years of education: Not on file     Highest education level: Not on file   Occupational History     Not on file   Social Needs     Financial resource strain: Not on file     Food insecurity     Worry: Not on file     Inability: Not on file     Transportation needs     Medical: Not on file     Non-medical: Not on file   Tobacco Use     Smoking status: Not on file   Substance and Sexual Activity     Alcohol use: Not on file     Drug use: Not on file     Sexual activity: Not on file   Lifestyle     Physical activity     Days per week: Not on file     Minutes per session: Not on file     Stress: Not on file   Relationships     Social connections     Talks on phone: Not on file     Gets together: Not on file      Attends Taoist service: Not on file     Active member of club or organization: Not on file     Attends meetings of clubs or organizations: Not on file     Relationship status: Not on file     Intimate partner violence     Fear of current or ex partner: Not on file     Emotionally abused: Not on file     Physically abused: Not on file     Forced sexual activity: Not on file   Other Topics Concern     Not on file   Social History Narrative     Not on file       Allergies:  Allergies   Allergen Reactions     Avelox [Moxifloxacin]      Cefdinir      Codeine      Latex      Omeprazole      Tramadol      Zofran [Ondansetron]        Is there a contrast allergy?  No    Medications:  Current Facility-Administered Medications   Medication     heparin (PRESSURE BAG) 2 Units/mL 0.9% NaCl (1000 mL)     Current Outpatient Medications   Medication Sig     acetaminophen (TYLENOL) 500 MG tablet Take 500 mg by mouth every 6 hours as needed for mild pain     aspirin (ASA) 81 MG EC tablet Take 81 mg by mouth At Bedtime     atropine 1 % ophthalmic solution Place 1 drop Into the left eye 2 times daily     calcium carbonate-vitamin D 600-200 MG-UNIT CAPS Take 1 capsule by mouth daily     dorzolamide-timolol (COSOPT) 2-0.5 % ophthalmic solution Place 1 drop Into the left eye 2 times daily     gabapentin (NEURONTIN) 100 MG capsule Take 100 mg by mouth daily CURRENTLY ON HOLD STARTING 4/2/2020     HYDROcodone-acetaminophen (NORCO) 5-325 MG tablet Take 0.5 tablets by mouth every 6 hours as needed for severe pain     latanoprost (XALATAN) 0.005 % ophthalmic solution Place 1 drop Into the left eye 2 times daily     losartan (COZAAR) 100 MG tablet Take 100 mg by mouth daily CURRENTLY ON HOLD STARTING 4/2/2020     metoprolol tartrate (LOPRESSOR) 50 MG tablet Take 50 mg by mouth 2 times daily     pravastatin (PRAVACHOL) 20 MG tablet Take 20 mg by mouth At Bedtime     prednisoLONE acetate (PRED FORTE) 1 % ophthalmic suspension Place 1 drop Into  the left eye 2 times daily     sertraline (ZOLOFT) 50 MG tablet Take 50 mg by mouth daily     artificial saliva (BIOTENE MT) SOLN solution Swish and spit 1 spray in mouth every 6 hours as needed for dry mouth     polyethylene glycol (MIRALAX) packet Take 17 g by mouth daily as needed for constipation   .    ROS:  The 5 point Review of Systems is negative other than noted in the HPI or here.     PHYSICAL EXAMINATION  Vital Signs:  B/P: 184/66,  T: 98.2,  P: 40,  R: 14    Cardio:  RRR  Pulmonary:  no respiratory distress  Abdomen:  soft    Neurologic  Mental Status:  fully alert, attentive and oriented, follows commands  Cranial Nerves:  facial sensation intact and symmetric, facial movements symmetric, hearing not formally tested but intact to conversation, no dysarthria, shoulder shrug strong bilaterally, tongue protrusion midline, swelling over left eye , unable to asses for acutiy, pateint reports bilateral decreased vision. more on the left.  Motor:  no abnormal movements, normal tone throughout, no pronator drift, normal and symmetric rapid finger tapping, able to move all limbs spontaneously, strength 5/5 throughout upper and lower extremities  Sensory:  intact/symmetric to light touch and pin prick throughout upper and lower extremities  Coordination:  unable to test (telestroke)    Pre-procedure National Institutes of Health Stroke Scale:   Not applicable    LABS  (most recent Cr, BUN, GFR, PLT, INR, PTT within the past 7 days):  Recent Labs   Lab 04/02/20  0912   CR 0.62   BUN 23   GFRESTIMATED 78   GFRESTBLACK 90      INR 1.03   PTT 30        Platelet Function P2Y12 (PRU):  Not applicable      ASSESSMENT: Ms. Ellis is anxious and concerned about her condition.  She underwent a cerebral angiogram and has bruising over her left groin area.  She is hesitant and worried about undergoing another exam.  As we told her this would be in the general anesthesia and she would be more comfortable.    PLAN:  Cerebral angiogram with intention to treat fistula.        PRE-PROCEDURE SEDATION ASSESSMENT     As per anesthesia note.    Patient was discussed with the Attending, Dr. Galeana, who agrees with the plan.    Masood Boateng MD   Pager: [phone].

## 2020-04-02 NOTE — LETTER
Health Information Management Services               Recipient:    Yung [facility]  [phone] (f)      Sender:  Chary, JESSE  / [phone]    [facility] [address] [phone]        Date: April 3, 2020  Patient Name:  Rhonda Padron  Routing Message:  Discharge orders          The documents accompanying this notice contain confidential information belonging to the sender.  This information is intended only for the use of the individual or entity named above.  The authorized recipient of this information is prohibited from disclosing this information to any other party and is required to destroy the information after its stated need has been fulfilled, unless otherwise required by state law.      If you are not the intended recipient, you are hereby notified that any disclosure, copy, distribution or action taken in reliance on the contents of these documents is strictly prohibited.  If you have received this document in error, please return it by fax to [phone] with a note on the cover sheet explaining why you are returning it (e.g. not your patient, not your provider, etc.).  If you need further assistance, please call [employer] Centralized Transcription at [phone].  Documents may also be returned by mail to [employer], , [facility] [address] [place], [place] [address].

## 2020-04-02 NOTE — ED PROVIDER NOTES
[place] EMERGENCY DEPARTMENT ([facility])  4/02/20    History     Chief Complaint   Patient presents with     Eye Pain     Left eye pain     The history is provided by the patient and medical records.     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 Reports this eye pain has been ongoing since October, however, states that over the past x1 month her symptoms has worsened. Describes this pain as sharp and continuous. Reports that this is affecting her vision, however, she is still able to see objects. Denies numbness or tingling to the left side of her face. Denies changes to her upper or lower extremities. Notes ongoing problems with swallowing.     Per chart review patient was admitted to [facility] on 3/25/2020 due to a diagnostic cerebral angiography and venography with intent to embolize a tiny acquired, carotic cavernous fistula. This fistula was unable to be accessed through either the arterial or venous pathways and was left untreated with no interventions taking place. She was given general endotracheal anesthesia for this procedure and was kept overnight for observation. Patients son requested inpatient surgical consultation due to the patients slowly-progressive dysphagia. The patient at that time indicated that she would not consider surgery and surgery determined that there was no emergent need for intervention of the slowly evolving thyroid mass. Prolactin was slightly elevated and thyroid testing was normal. States that she last ate last night around 6 PM.    Per further chart review patients son called the [facility] Stroke/Vascular Neurology line on 4/1/2020 and reported that the patients left eye was weak,  painful and that the patient could not see. He noted a lot of blood in the patients eye.     I have reviewed the Medications, Allergies, Past Medical and Surgical History, and Social History in the [facility] system.  PAST MEDICAL HISTORY: History reviewed. No pertinent past medical history.    PAST SURGICAL HISTORY: History reviewed. No pertinent surgical history.    Past medical history, past surgical history, medications, and allergies were reviewed with the patient. Additional pertinent items: None    FAMILY HISTORY: History reviewed. No pertinent family history.    SOCIAL HISTORY:   Social History     Tobacco Use     Smoking status: Never Smoker   Substance Use Topics     Alcohol use: Not on file     Social history was reviewed with the patient. Additional pertinent items: None      Current Discharge Medication List      CONTINUE these medications which have NOT CHANGED    Details   acetaminophen (TYLENOL) 500 MG tablet Take 500 mg by mouth every 6 hours as needed for mild pain      aspirin (ASA) 81 MG EC tablet Take 81 mg by mouth At Bedtime      atropine 1 % ophthalmic solution Place 1 drop Into the left eye 2 times daily      calcium carbonate-vitamin D 600-200 MG-UNIT CAPS Take 1 capsule by mouth daily      dorzolamide-timolol (COSOPT) 2-0.5 % ophthalmic solution Place 1 drop Into the left eye 2 times daily      gabapentin (NEURONTIN) 100 MG capsule Take 100 mg by mouth daily CURRENTLY ON HOLD STARTING 4/2/2020      HYDROcodone-acetaminophen (NORCO) 5-325 MG tablet Take 0.5 tablets by mouth every 6 hours as needed for severe pain      latanoprost (XALATAN) 0.005 % ophthalmic solution Place 1 drop Into the left eye 2 times daily      losartan (COZAAR) 100 MG tablet Take 100 mg by mouth daily CURRENTLY ON HOLD STARTING 4/2/2020      metoprolol tartrate (LOPRESSOR) 50 MG tablet Take 50 mg by mouth 2 times daily      pravastatin (PRAVACHOL) 20 MG tablet Take 20 mg by mouth At Bedtime      prednisoLONE acetate (PRED  "FORTE) 1 % ophthalmic suspension Place 1 drop Into the left eye 2 times daily      sertraline (ZOLOFT) 50 MG tablet Take 50 mg by mouth daily      artificial saliva (BIOTENE MT) SOLN solution Swish and spit 1 spray in mouth every 6 hours as needed for dry mouth      polyethylene glycol (MIRALAX) packet Take 17 g by mouth daily as needed for constipation                Allergies   Allergen Reactions     Avelox [Moxifloxacin]      Cefdinir      Codeine      Latex      Omeprazole      Tramadol      Zofran [Ondansetron]         Review of Systems   Constitutional: Negative for fever.   HENT: Negative for congestion.    Eyes: Positive for pain (Left eye) and visual disturbance. Negative for redness.   Respiratory: Negative for shortness of breath.    Cardiovascular: Negative for chest pain.   Gastrointestinal: Negative for abdominal pain.   Genitourinary: Negative for difficulty urinating.   Musculoskeletal: Negative for arthralgias and neck stiffness.   Skin: Negative for color change.   Neurological: Negative for numbness and headaches.   Psychiatric/Behavioral: Negative for confusion.     Physical Exam   BP: (!) 158/64  Pulse: (!) 40  Heart Rate: (!) 41  Temp: 98.2  F (36.8  C)  Resp: 14  Height: 167.6 cm (5' 6\")  Weight: 59 kg (130 lb)  SpO2: 94 %      Physical Exam  Constitutional:       General: She is not in acute distress.     Appearance: She is not diaphoretic.   HENT:      Head: Atraumatic.      Mouth/Throat:      Pharynx: No oropharyngeal exudate.   Eyes:      General: Lids are normal. No scleral icterus.        Right eye: No foreign body, discharge or hordeolum.         Left eye: No foreign body, discharge or hordeolum.      Extraocular Movements: Extraocular movements intact.      Conjunctiva/sclera:      Right eye: Right conjunctiva is not injected. No chemosis, exudate or hemorrhage.     Left eye: Left conjunctiva is injected. No chemosis, exudate or hemorrhage.     Pupils: Pupils are equal, round, and " reactive to light.     Cardiovascular:      Heart sounds: Normal heart sounds.   Pulmonary:      Effort: No respiratory distress.      Breath sounds: Normal breath sounds.   Abdominal:      General: Bowel sounds are normal.      Palpations: Abdomen is soft.      Tenderness: There is no abdominal tenderness.   Musculoskeletal:         General: No tenderness.   Skin:     General: Skin is warm.      Findings: No rash.         ED Course   8:43 AM  The patient was seen and examined by Ananth Alcantara MD in Room ED16.        Procedures             EKG Interpretation:      Interpreted by Ananth Alcantara MD, MD  Time reviewed: on arrival  Symptoms at time of EKG: left eye pain   Rhythm: 2 degree AV block - type II  Rate: Normal and Bradycardia  Axis: Normal  Ectopy: none  Conduction: 2nd degree AV block- type II  ST Segments/ T Waves: No ST-T wave changes  Q Waves: none  Comparison to prior: none    Clinical Impression: 2nd degree AV block-mobitz type II ?intermittent            Results for orders placed or performed during the hospital encounter of 04/02/20 (from the past 24 hour(s))   XR Chest Port 1 View     Status: None    Collection Time: 04/02/20  9:11 AM   Result Value Ref Range    Radiologist flags Thyroid mass     Narrative    EXAM: XR CHEST PORT 1 VW  4/2/2020 9:11 AM      HISTORY: carotid cavernous fistula preop    COMPARISON: None.    FINDINGS: Single view of the chest. Trachea is deviated to the left,  heart size is normal. No focal pulmonary opacity, pneumothorax, or  pleural effusion. No acute osseous or abdominal abnormality.      Impression    IMPRESSION:  1. Leftward deviation of the trachea, secondary to indeterminate right  thyroid mass. Consider further workup with ultrasound and/or FNA.  2. Otherwise clear chest.      [Recommend Follow Up: Thyroid mass]    This report will be copied to the [facility] to ensure a  provider acknowledges the finding.     I have personally reviewed the  examination and initial interpretation  and I agree with the findings.    MARCIE MORRISON MD   CBC with platelets differential     Status: None    Collection Time: 04/02/20  9:12 AM   Result Value Ref Range    WBC 7.4 4.0 - 11.0 10e9/L    RBC Count 4.14 3.8 - 5.2 10e12/L    Hemoglobin 12.4 11.7 - 15.7 g/dL    Hematocrit 39.3 35.0 - 47.0 %    MCV 95 78 - 100 fl    MCH 30.0 26.5 - 33.0 pg    MCHC 31.6 31.5 - 36.5 g/dL    RDW 13.2 10.0 - 15.0 %    Platelet Count 180 150 - 450 10e9/L    Diff Method Automated Method     % Neutrophils 58.8 %    % Lymphocytes 27.1 %    % Monocytes 11.9 %    % Eosinophils 1.4 %    % Basophils 0.4 %    % Immature Granulocytes 0.4 %    Nucleated RBCs 0 0 /100    Absolute Neutrophil 4.3 1.6 - 8.3 10e9/L    Absolute Lymphocytes 2.0 0.8 - 5.3 10e9/L    Absolute Monocytes 0.9 0.0 - 1.3 10e9/L    Absolute Eosinophils 0.1 0.0 - 0.7 10e9/L    Absolute Basophils 0.0 0.0 - 0.2 10e9/L    Abs Immature Granulocytes 0.0 0 - 0.4 10e9/L    Absolute Nucleated RBC 0.0    ABO/Rh type and screen     Status: None    Collection Time: 04/02/20  9:12 AM   Result Value Ref Range    ABO A     RH(D) Pos     Antibody Screen Neg     Test Valid Only At          [facility]    Specimen Expires 04/05/2020    INR     Status: None    Collection Time: 04/02/20  9:12 AM   Result Value Ref Range    INR 1.03 0.86 - 1.14   Partial thromboplastin time     Status: None    Collection Time: 04/02/20  9:12 AM   Result Value Ref Range    PTT 30 22 - 37 sec   Comprehensive metabolic panel     Status: Abnormal    Collection Time: 04/02/20  9:12 AM   Result Value Ref Range    Sodium 138 133 - 144 mmol/L    Potassium 4.0 3.4 - 5.3 mmol/L    Chloride 106 94 - 109 mmol/L    Carbon Dioxide 29 20 - 32 mmol/L    Anion Gap 3 3 - 14 mmol/L    Glucose 107 (H) 70 - 99 mg/dL    Urea Nitrogen 23 7 - 30 mg/dL    Creatinine 0.62 0.52 - 1.04 mg/dL    GFR Estimate 78 >60 mL/min/[1.73_m2]    GFR Estimate If  Black 90 >60 mL/min/[1.73_m2]    Calcium 10.0 8.5 - 10.1 mg/dL    Bilirubin Total 0.5 0.2 - 1.3 mg/dL    Albumin 3.3 (L) 3.4 - 5.0 g/dL    Protein Total 6.9 6.8 - 8.8 g/dL    Alkaline Phosphatase 69 40 - 150 U/L    ALT 27 0 - 50 U/L    AST 33 0 - 45 U/L   EKG 12-lead, tracing only     Status: None (Preliminary result)    Collection Time: 04/02/20  9:29 AM   Result Value Ref Range    Interpretation ECG Click View Image link to view waveform and result            Results for orders placed or performed during the hospital encounter of 04/02/20 (from the past 24 hour(s))   XR Chest Port 1 View   Result Value Ref Range    Radiologist flags Thyroid mass     Narrative    EXAM: XR CHEST PORT 1 VW  4/2/2020 9:11 AM      HISTORY: carotid cavernous fistula preop    COMPARISON: None.    FINDINGS: Single view of the chest. Trachea is deviated to the left,  heart size is normal. No focal pulmonary opacity, pneumothorax, or  pleural effusion. No acute osseous or abdominal abnormality.      Impression    IMPRESSION:  1. Leftward deviation of the trachea, secondary to indeterminate right  thyroid mass. Consider further workup with ultrasound and/or FNA.  2. Otherwise clear chest.      [Recommend Follow Up: Thyroid mass]    This report will be copied to the [facility] to ensure a  provider acknowledges the finding.     I have personally reviewed the examination and initial interpretation  and I agree with the findings.    MARCIE MORRISON MD   CBC with platelets differential   Result Value Ref Range    WBC 7.4 4.0 - 11.0 10e9/L    RBC Count 4.14 3.8 - 5.2 10e12/L    Hemoglobin 12.4 11.7 - 15.7 g/dL    Hematocrit 39.3 35.0 - 47.0 %    MCV 95 78 - 100 fl    MCH 30.0 26.5 - 33.0 pg    MCHC 31.6 31.5 - 36.5 g/dL    RDW 13.2 10.0 - 15.0 %    Platelet Count 180 150 - 450 10e9/L    Diff Method Automated Method     % Neutrophils 58.8 %    % Lymphocytes 27.1 %    % Monocytes 11.9 %    % Eosinophils 1.4 %    % Basophils 0.4 %    %  Immature Granulocytes 0.4 %    Nucleated RBCs 0 0 /100    Absolute Neutrophil 4.3 1.6 - 8.3 10e9/L    Absolute Lymphocytes 2.0 0.8 - 5.3 10e9/L    Absolute Monocytes 0.9 0.0 - 1.3 10e9/L    Absolute Eosinophils 0.1 0.0 - 0.7 10e9/L    Absolute Basophils 0.0 0.0 - 0.2 10e9/L    Abs Immature Granulocytes 0.0 0 - 0.4 10e9/L    Absolute Nucleated RBC 0.0    ABO/Rh type and screen   Result Value Ref Range    ABO A     RH(D) Pos     Antibody Screen Neg     Test Valid Only At          [facility]    Specimen Expires 04/05/2020    INR   Result Value Ref Range    INR 1.03 0.86 - 1.14   Partial thromboplastin time   Result Value Ref Range    PTT 30 22 - 37 sec   Comprehensive metabolic panel   Result Value Ref Range    Sodium 138 133 - 144 mmol/L    Potassium 4.0 3.4 - 5.3 mmol/L    Chloride 106 94 - 109 mmol/L    Carbon Dioxide 29 20 - 32 mmol/L    Anion Gap 3 3 - 14 mmol/L    Glucose 107 (H) 70 - 99 mg/dL    Urea Nitrogen 23 7 - 30 mg/dL    Creatinine 0.62 0.52 - 1.04 mg/dL    GFR Estimate 78 >60 mL/min/[1.73_m2]    GFR Estimate If Black 90 >60 mL/min/[1.73_m2]    Calcium 10.0 8.5 - 10.1 mg/dL    Bilirubin Total 0.5 0.2 - 1.3 mg/dL    Albumin 3.3 (L) 3.4 - 5.0 g/dL    Protein Total 6.9 6.8 - 8.8 g/dL    Alkaline Phosphatase 69 40 - 150 U/L    ALT 27 0 - 50 U/L    AST 33 0 - 45 U/L   EKG 12-lead, tracing only   Result Value Ref Range    Interpretation ECG Click View Image link to view waveform and result      Medications   lidocaine 1 % 0.1-1 mL (has no administration in time range)   lidocaine (LMX4) cream (has no administration in time range)   sodium chloride (PF) 0.9% PF flush 3 mL (has no administration in time range)   sodium chloride (PF) 0.9% PF flush 3 mL (has no administration in time range)   sodium chloride 0.9% infusion (has no administration in time range)   medication instruction (has no administration in time range)   glucose gel 15-30 g (has no administration in  time range)     Or   dextrose 50 % injection 25-50 mL (has no administration in time range)     Or   glucagon injection 1 mg (has no administration in time range)   heparin (PRESSURE BAG) 2 Units/mL 0.9% NaCl (1000 mL) (7 Bags TABLE SOLN New Bag by Other Clinician 4/2/20 1243)   iodixanol (VISIPAQUE 320) injection 150 mL (has no administration in time range)   lactated ringers infusion (has no administration in time range)   ondansetron (ZOFRAN-ODT) ODT tab 4 mg (has no administration in time range)     Or   ondansetron (ZOFRAN) injection 4 mg (has no administration in time range)   fentaNYL (PF) (SUBLIMAZE) injection 25-50 mcg (has no administration in time range)   HYDROmorphone (PF) (DILAUDID) injection 0.3-0.5 mg (has no administration in time range)   metoprolol (LOPRESSOR) injection 1-2 mg (has no administration in time range)   Medication Considerations - To maintain platelet inhibition after discontinuation of cangrelor (KENGREAL) [see admin instructions] (has no administration in time range)   eptifibatide (INTEGRILIN) 2 mg/mL loading dose (8 mg Intravenous Given 4/2/20 1536)     Followed by   eptifibatide (INTEGRILIN) 200 mg/100 mL infusion (has no administration in time range)   lidocaine (PF) (XYLOCAINE) 1 % injection 1-30 mL (10 mLs Subcutaneous Given by Other Clinician 4/2/20 1312)             Assessments & Plan (with Medical Decision Making)    Left eye pain due to carotid cavernous fistula-Neuro Intervention aware, pre ope lab CXR EKG done, ?intermittent AV Block Mobitz type 2, to pre ope for further evaluation and managements.         I have reviewed the nursing notes.    I have reviewed the findings, diagnosis, plan and need for follow up with the patient.    Current Discharge Medication List          Final diagnoses:   Acquired left carotid-cavernous fistula   Left eye pain   AV block, Mobitz 2 intermittent     Hugh OTT, am serving as a trained medical scribe to document services personally  performed by Ananth Alcantara MD, based on the provider's statements to me.   I, Ananth Alcantara MD, was physically present and have reviewed and verified the accuracy of this note documented by Hugh Weaver.    4/2/2020   [facility], [place], EMERGENCY DEPARTMENT     Ananth Alcantara MD  04/02/20 1559

## 2020-04-02 NOTE — PROGRESS NOTES
Cerebral angiogram with intervention yielded in 286.3 minutes of fluoroscopy and 3519 mGy. Dr. Galeana notified.

## 2020-04-02 NOTE — PROGRESS NOTES
Patient Name: Rhonda Padron  Medical Record Number: [id number]  Today's Date: 4/2/2020    Procedure: Cerebral angiogram with intention to treat carotid cavernous fistula  Proceduralist: Dr. Galeana, Dr. Dyson, Dr. Boateng    Procedure Start: 1215  Procedure end: 1730  Sedation medications administered: Per anesthesia     Report given to: PACU per anesthesia  : NA    Other Notes: Pt arrived to IR room 3 from ED. Consent reviewed. Pt denies any questions or concerns regarding procedure. Pt positioned supine and monitored per protocol. Pt tolerated procedure without any noted complications.  Starclose deployed to left groin @ 1720. Starclose deployed to right groin@1730. Exoseal deployed to Left venous access @ 1735.Pt transferred back to PACU.    Daisy Marti, RN

## 2020-04-02 NOTE — ANESTHESIA CARE TRANSFER NOTE
Patient: Rhonda Padron    Procedure(s):  Angiogram in interventional radiology    Diagnosis: Abnormal angiogram [R93.89]  Diagnosis Additional Information: No value filed.    Anesthesia Type:   General     Note:  Airway :Oral Airway and Face Mask  Patient transferred to:PACU  Comments: Patient is Awake, VSS. Patient is breathing Spontaneously with face mask . No obvious complications from anesthesia. Care report given to PACU RN, and notified of assigned Anesthesiology staff to patient for continuity of PACU care.     Regan Villarreal DO.  Anesthesiology Resident CA-1, PGY-2  Pager [phone]  Handoff Report: Identifed the Patient, Identified the Reponsible Provider, Reviewed the pertinent medical history, Discussed the surgical course, Reviewed Intra-OP anesthesia mangement and issues during anesthesia, Set expectations for post-procedure period and Allowed opportunity for questions and acknowledgement of understanding      Vitals: (Last set prior to Anesthesia Care Transfer)    CRNA VITALS  4/2/2020 1724 - 4/2/2020 1804      4/2/2020             NIBP:  156/75    Pulse:  114    SpO2:  100 %                Electronically Signed By: Regan Villarreal DO  April 2, 2020  6:04 PM

## 2020-04-03 LAB
ANION GAP SERPL CALCULATED.3IONS-SCNC: 3 MMOL/L (ref 3–14)
BUN SERPL-MCNC: 18 MG/DL (ref 7–30)
CALCIUM SERPL-MCNC: 8.7 MG/DL (ref 8.5–10.1)
CHLORIDE SERPL-SCNC: 113 MMOL/L (ref 94–109)
CO2 SERPL-SCNC: 27 MMOL/L (ref 20–32)
CREAT SERPL-MCNC: 0.66 MG/DL (ref 0.52–1.04)
ERYTHROCYTE [DISTWIDTH] IN BLOOD BY AUTOMATED COUNT: 13.4 % (ref 10–15)
GFR SERPL CREATININE-BSD FRML MDRD: 76 ML/MIN/{1.73_M2}
GLUCOSE SERPL-MCNC: 100 MG/DL (ref 70–99)
HCT VFR BLD AUTO: 30.9 % (ref 35–47)
HCT VFR BLD AUTO: 31.8 % (ref 35–47)
HGB BLD-MCNC: 10 G/DL (ref 11.7–15.7)
HGB BLD-MCNC: 10.1 G/DL (ref 11.7–15.7)
INTERPRETATION ECG - MUSE: NORMAL
MCH RBC QN AUTO: 29.5 PG (ref 26.5–33)
MCHC RBC AUTO-ENTMCNC: 31.8 G/DL (ref 31.5–36.5)
MCV RBC AUTO: 93 FL (ref 78–100)
PLATELET # BLD AUTO: 149 10E9/L (ref 150–450)
PLATELET # BLD AUTO: 149 10E9/L (ref 150–450)
POTASSIUM SERPL-SCNC: 4.1 MMOL/L (ref 3.4–5.3)
RBC # BLD AUTO: 3.42 10E12/L (ref 3.8–5.2)
SODIUM SERPL-SCNC: 142 MMOL/L (ref 133–144)
WBC # BLD AUTO: 8.9 10E9/L (ref 4–11)

## 2020-04-03 PROCEDURE — 36415 COLL VENOUS BLD VENIPUNCTURE: CPT | Performed by: RADIOLOGY

## 2020-04-03 PROCEDURE — 25000132 ZZH RX MED GY IP 250 OP 250 PS 637: Mod: GY | Performed by: STUDENT IN AN ORGANIZED HEALTH CARE EDUCATION/TRAINING PROGRAM

## 2020-04-03 PROCEDURE — 03VK3HZ RESTRICTION OF RIGHT INTERNAL CAROTID ARTERY WITH INTRALUMINAL DEVICE, FLOW DIVERTER, PERCUTANEOUS APPROACH: ICD-10-PCS | Performed by: RADIOLOGY

## 2020-04-03 PROCEDURE — 85027 COMPLETE CBC AUTOMATED: CPT | Performed by: RADIOLOGY

## 2020-04-03 PROCEDURE — 25000128 H RX IP 250 OP 636: Performed by: STUDENT IN AN ORGANIZED HEALTH CARE EDUCATION/TRAINING PROGRAM

## 2020-04-03 PROCEDURE — 40000141 ZZH STATISTIC PERIPHERAL IV START W/O US GUIDANCE

## 2020-04-03 PROCEDURE — 20000004 ZZH R&B ICU UMMC

## 2020-04-03 PROCEDURE — 25000125 ZZHC RX 250: Performed by: STUDENT IN AN ORGANIZED HEALTH CARE EDUCATION/TRAINING PROGRAM

## 2020-04-03 PROCEDURE — 25800030 ZZH RX IP 258 OP 636: Performed by: STUDENT IN AN ORGANIZED HEALTH CARE EDUCATION/TRAINING PROGRAM

## 2020-04-03 PROCEDURE — 80048 BASIC METABOLIC PNL TOTAL CA: CPT | Performed by: RADIOLOGY

## 2020-04-03 RX ORDER — OXYMETAZOLINE HYDROCHLORIDE 0.05 G/100ML
3 SPRAY NASAL 2 TIMES DAILY
Status: DISCONTINUED | OUTPATIENT
Start: 2020-04-03 | End: 2020-04-04 | Stop reason: HOSPADM

## 2020-04-03 RX ORDER — LABETALOL 20 MG/4 ML (5 MG/ML) INTRAVENOUS SYRINGE
10 EVERY 4 HOURS PRN
Status: DISCONTINUED | OUTPATIENT
Start: 2020-04-03 | End: 2020-04-04 | Stop reason: HOSPADM

## 2020-04-03 RX ADMIN — LATANOPROST 1 DROP: 50 SOLUTION OPHTHALMIC at 20:28

## 2020-04-03 RX ADMIN — ATROPINE SULFATE 1 DROP: 10 SOLUTION/ DROPS OPHTHALMIC at 20:28

## 2020-04-03 RX ADMIN — PRAVASTATIN SODIUM 20 MG: 20 TABLET ORAL at 00:22

## 2020-04-03 RX ADMIN — LATANOPROST 1 DROP: 50 SOLUTION OPHTHALMIC at 09:07

## 2020-04-03 RX ADMIN — PREDNISOLONE ACETATE 1 DROP: 10 SUSPENSION/ DROPS OPHTHALMIC at 00:17

## 2020-04-03 RX ADMIN — ACETAMINOPHEN 500 MG: 500 TABLET, FILM COATED ORAL at 14:42

## 2020-04-03 RX ADMIN — DORZOLAMIDE HYDROCHLORIDE AND TIMOLOL MALEATE 1 DROP: 20; 5 SOLUTION/ DROPS OPHTHALMIC at 20:28

## 2020-04-03 RX ADMIN — DORZOLAMIDE HYDROCHLORIDE AND TIMOLOL MALEATE 1 DROP: 20; 5 SOLUTION/ DROPS OPHTHALMIC at 00:15

## 2020-04-03 RX ADMIN — LABETALOL 20 MG/4 ML (5 MG/ML) INTRAVENOUS SYRINGE 10 MG: at 23:44

## 2020-04-03 RX ADMIN — ATROPINE SULFATE 1 DROP: 10 SOLUTION/ DROPS OPHTHALMIC at 00:18

## 2020-04-03 RX ADMIN — PREDNISOLONE ACETATE 1 DROP: 10 SUSPENSION/ DROPS OPHTHALMIC at 09:08

## 2020-04-03 RX ADMIN — PRAVASTATIN SODIUM 20 MG: 20 TABLET ORAL at 21:56

## 2020-04-03 RX ADMIN — TICAGRELOR 90 MG: 90 TABLET ORAL at 20:12

## 2020-04-03 RX ADMIN — ASPIRIN 81 MG: 81 TABLET, COATED ORAL at 00:23

## 2020-04-03 RX ADMIN — LOSARTAN POTASSIUM 100 MG: 100 TABLET, FILM COATED ORAL at 02:10

## 2020-04-03 RX ADMIN — PREDNISOLONE ACETATE 1 DROP: 10 SUSPENSION/ DROPS OPHTHALMIC at 20:28

## 2020-04-03 RX ADMIN — ACETAMINOPHEN 500 MG: 500 TABLET, FILM COATED ORAL at 20:12

## 2020-04-03 RX ADMIN — ASPIRIN 81 MG: 81 TABLET, COATED ORAL at 21:56

## 2020-04-03 RX ADMIN — METOPROLOL TARTRATE 50 MG: 50 TABLET, FILM COATED ORAL at 09:07

## 2020-04-03 RX ADMIN — OXYMETAZOLINE HYDROCHLORIDE 3 SPRAY: 0.05 SPRAY NASAL at 15:31

## 2020-04-03 RX ADMIN — LOSARTAN POTASSIUM 100 MG: 100 TABLET, FILM COATED ORAL at 09:06

## 2020-04-03 RX ADMIN — LATANOPROST 1 DROP: 50 SOLUTION OPHTHALMIC at 00:18

## 2020-04-03 RX ADMIN — DORZOLAMIDE HYDROCHLORIDE AND TIMOLOL MALEATE 1 DROP: 20; 5 SOLUTION/ DROPS OPHTHALMIC at 09:07

## 2020-04-03 RX ADMIN — METOPROLOL TARTRATE 50 MG: 50 TABLET, FILM COATED ORAL at 20:12

## 2020-04-03 RX ADMIN — SODIUM CHLORIDE, POTASSIUM CHLORIDE, SODIUM LACTATE AND CALCIUM CHLORIDE: 600; 310; 30; 20 INJECTION, SOLUTION INTRAVENOUS at 06:49

## 2020-04-03 RX ADMIN — TICAGRELOR 180 MG: 90 TABLET ORAL at 09:06

## 2020-04-03 RX ADMIN — ACETAMINOPHEN 500 MG: 500 TABLET, FILM COATED ORAL at 02:08

## 2020-04-03 RX ADMIN — LABETALOL 20 MG/4 ML (5 MG/ML) INTRAVENOUS SYRINGE 10 MG: at 10:47

## 2020-04-03 RX ADMIN — METOPROLOL TARTRATE 50 MG: 50 TABLET, FILM COATED ORAL at 00:23

## 2020-04-03 RX ADMIN — ATROPINE SULFATE 1 DROP: 10 SOLUTION/ DROPS OPHTHALMIC at 09:08

## 2020-04-03 RX ADMIN — OXYMETAZOLINE HYDROCHLORIDE 3 SPRAY: 0.05 SPRAY NASAL at 20:27

## 2020-04-03 ASSESSMENT — ACTIVITIES OF DAILY LIVING (ADL)
ADLS_ACUITY_SCORE: 21
ADLS_ACUITY_SCORE: 19
ADLS_ACUITY_SCORE: 21
ADLS_ACUITY_SCORE: 19

## 2020-04-03 ASSESSMENT — PAIN DESCRIPTION - DESCRIPTORS: DESCRIPTORS: HEADACHE

## 2020-04-03 ASSESSMENT — MIFFLIN-ST. JEOR: SCORE: 1026.75

## 2020-04-03 NOTE — PROGRESS NOTES
Writer called report to [facility] RN, Svetlana. Medication changes, activity orders, and POC reported. Pt's son will pick pt up at 1400.

## 2020-04-03 NOTE — PLAN OF CARE
Admitted/transferred from: PACU  Reason for admission/transfer: ICU monitoring   2 RN skin assessment: completed by Rosio JUAREZ RN and Derrick MOROCHO RN   Result of skin assessment and interventions/actions: Femoral groin puncture sites - R and L sides, bruising L neck   Patient belongings: glasses and socks     Major Shift Events: Patient arrived to ICU from PACU 4/2/20 at 2130  Neuro: drowsy. Bilateral scleral edema. Blurry vision bilateral eyes with left worse than right. Difficult to assess pupil on L side due to blood in sclera   CV: NSR/SB with 1st degree AV block and occasional PVCs  Resp: Room air. Clear lungs  GI: clear liquid diet. Patient unsure of last bowel movement  : urinary catheter in place - diminished urine production   CMS intact, pulses palpable     Integrilin - 2mcg/kg/min  X2 PIVs     For vital signs and complete assessments, please see documentation flowsheets.

## 2020-04-03 NOTE — PROVIDER NOTIFICATION
Writer informed Neuro IR that pt's nose was bleeding [id number], unable to get it to stop, felt uncomfortable discharging pt while nose still bleeding. Son arrived around 1405 and writer informed him that pt's nose was bleeding and writer wanted to clarify with MD discharge plan. Writer spoke with Neuro IR at 1410, plan to monitor pt another hour and if nose stopped bleeding by then, OK to send. Son updated on plan.     1445: Discharge cancelled per Neuro IR. Plan to watch pt overnight. OK to transfer to floor tomorrow or discharge to nursing home (pending approval from facility).

## 2020-04-03 NOTE — PROGRESS NOTES
"Called [facility] to inform RN that pt will no longer be discharging today as we are not able to stop/control her bloody nose (per Neuro IR MD). RN stated thanks for the update and that pt will probably not be able to discharge back until Monday as they \"do not have any RN's in the building on the weekend to do admissions.\"  "

## 2020-04-03 NOTE — ANESTHESIA POSTPROCEDURE EVALUATION
Anesthesia POST Procedure Evaluation    Patient: Rhonda Padron   MRN:     [id number] Gender:   female   Age:    89 year old :      1927        Preoperative Diagnosis: Abnormal angiogram [R93.89]   Procedure(s):  Angiogram in interventional radiology   Postop Comments: No value filed.     Anesthesia Type: General          Postop Pain Control: Uneventful            Sign Out: Well controlled pain   PONV: No   Neuro/Psych: Uneventful            Sign Out: Acceptable/Baseline neuro status   Airway/Respiratory: Uneventful            Sign Out: Acceptable/Baseline resp. status   CV/Hemodynamics: Uneventful            Sign Out: Acceptable CV status   Other NRE: NONE   DID A NON-ROUTINE EVENT OCCUR? No         Last Anesthesia Record Vitals:  CRNA VITALS  2020 1724 - 2020 1824      2020             NIBP:  156/75    Pulse:  114    SpO2:  100 %          Last PACU Vitals:  Vitals Value Taken Time   /71 2020 10:00 PM   Temp 36.8  C (98.3  F) 2020  9:31 PM   Pulse 115 2020 10:00 PM   Resp 20 2020  9:31 PM   SpO2 99 % 2020 10:26 PM   Temp src     NIBP 156/75 2020  6:00 PM   Pulse 114 2020  6:00 PM   SpO2 100 % 2020  6:00 PM   Resp     Temp     Ht Rate     Temp 2     Vitals shown include unvalidated device data.      Electronically Signed By: Raleigh Macias DO, 2020, 10:28 PM

## 2020-04-03 NOTE — PLAN OF CARE
Major Shift Events:  Epistaxis multiple times throughout the day requiring afrin spray, no bleeding s/p afrin.   Plan: Discharge cancelled. Monitor in ICU overnight. Place PIV per floor protocol. OK to transfer to floor tomorrow. Neuro IR will attempt to call pt's nursing home for admittance over the weekend. Nursing Director is Aura Winters ([phone]).     For vital signs and complete assessments, please see documentation flowsheets.

## 2020-04-03 NOTE — OR NURSING
Neuro IR Dr. Boateng called to place transfer order and Integrilin order Pt arrived from IR with Integrilin running at 1 mcg/kg/minute. Dr. Boateng stated he would put order in for Integrilin and stated he wanted the Integrilin to run at that dose. No order placed at this time will continue with Integrilin at dose patient arrived her. Hand off given to Joaquin Parham

## 2020-04-03 NOTE — DISCHARGE SUMMARY
[facility], [place]    Endovascular Surgical Neuroradiology Discharge Summary    Date of Admission: 4/2/2020  Date of Discharge: 04/03/2020    Disposition: Discharged to nursing home  Primary Care Physician: Marivel Tamayo      Admission Diagnosis:   Right carotid cavernous fistula    Discharge Diagnosis:   right carotid carvernous fistula treated with flow diverting stent    Problem Leading to Hospitalization (from [facility]):   Left orbital swelling, proptosis, chemosis , progressive loss of vision.    Please see H&P dated 4/2/2020 for further details about presentation.    Brief Hospital Course:   Patient was admitted to neurointensive care unit for overnight observation after procedure. He/She had an uneventful course. There were no new problems. Patient was tolerating oral diet and was ambulating independently. Her groin access site was dry and intact without any evidence of hematoma or tenderness. She was provided detailed explanation about her treatment and follow-up plan. She was discharged home in stable condition.     Complications: None.     Other problems addressed during this hospitalization:none    PHYSICAL EXAMINATION  Vital Signs:  B/P: 167/51, T: 98, P: 72, R: 16    Mentation: Awake, alert & oriented x3  Speech: Normal. No dysarthria or aphasia  Cranial nerve exam: Left orbital swelling and proptosis, bilateral blurred vison Facial sensations intact, face symmetric, hearing normal B/L, Shoulder shrug strong B/L, tongue movements intact  Motor: Strength 5/5 throughout  Sensations: Intact B/L to light touch  Coordination: unable to test  Gait: Deferred    National Institutes of Health Stroke Scale (on day of discharge)  NIHSS Total Score: 2        mRS 4 - Moderately severe disability. Unable to attend to own bodily needs without assistance, and unable to walk unassisted.      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         CONTINUE these medications which have NOT CHANGED    Details   acetaminophen (TYLENOL) 500 MG tablet Take 500 mg by mouth every 6 hours as needed for mild pain      aspirin (ASA) 81 MG EC tablet Take 81 mg by mouth At Bedtime      atropine 1 % ophthalmic solution Place 1 drop Into the left eye 2 times daily      calcium carbonate-vitamin D 600-200 MG-UNIT CAPS Take 1 capsule by mouth daily      dorzolamide-timolol (COSOPT) 2-0.5 % ophthalmic solution Place 1 drop Into the left eye 2 times daily      gabapentin (NEURONTIN) 100 MG capsule Take 100 mg by mouth daily CURRENTLY ON HOLD STARTING 4/2/2020      HYDROcodone-acetaminophen (NORCO) 5-325 MG tablet Take 0.5 tablets by mouth every 6 hours as needed for severe pain      latanoprost (XALATAN) 0.005 % ophthalmic solution Place 1 drop Into the left eye 2 times daily      losartan (COZAAR) 100 MG tablet Take 100 mg by mouth daily CURRENTLY ON HOLD STARTING 4/2/2020      metoprolol tartrate (LOPRESSOR) 50 MG tablet Take 50 mg by mouth 2 times daily      pravastatin (PRAVACHOL) 20 MG tablet Take 20 mg by mouth At Bedtime      prednisoLONE acetate (PRED FORTE) 1 % ophthalmic suspension Place 1 drop Into the left eye 2 times daily      sertraline (ZOLOFT) 50 MG tablet Take 50 mg by mouth daily      artificial saliva (BIOTENE MT) SOLN solution Swish and spit 1 spray in mouth every 6 hours as needed for dry mouth      polyethylene glycol (MIRALAX) packet Take 17 g by mouth daily as needed for constipation             Additional recommendations and follow up:       Reason for your hospital stay    You were admitted for observation after having being treated for a right carotid cavernous fistula with flow diverting stent.     Adult [facility] Follow-up and recommended labs and tests    Follow up with   kirti Galeana (location with clinic name or city) [facility] clinic, within 1month  to evaluate after surgery. No follow up labs or test are needed.    Appointments on [place] and/or [facility] (with [facility] or [facility] provider or service). Call [phone] if you haven't heard regarding these appointments within 7 days of discharge.     Activity    Your activity upon discharge: activity as tolerated     Discharge Instructions    What you may eat or drink after angiogram:  -- There are no changes in what you should eat or drink after this test except that you should drink at least six to eight 8-ounce glasses of fluid each day for 2 days to flush the contrast (dye) out of your body unless you are on a limited fluids diet.    Moving around after angiogram:  -- After your test: Rest 48 hours and follow the special activity instructions listed.    Special activity restrictions:  -- Limit physical activity for 48 hours.  Rest on a sofa or bed as much as possible.  -- No strenuous activity.  -- No long walks.  -- Avoid climbing stairs if possible.  If you must climb stairs, do it slowly.    Lifting:  -- Do not lift more than 10 pounds for 48 hours.   -- Do not lift more than 20 pounds for 7 days. (A full gallon of water weighs about 8 pounds)    Sexual activity:  -- You can resume sexual activity in 2 days.  Bathing/Swimming:  -- You may shower in 24 hours .  -- You may NOT take a tub bath, swim, or sit in water for 5 days.    The groin puncture site:  Care:  -- Keep the area clean and dry except for during daily shower.  -- Clean the site daily using soap and water while standing in the shower.  Pat dry thoroughly.  -- Cover the groin site with a bandaid until the skin has closed.   -- When you sneeze, cough or laugh, hold pressure on the puncture site.  -- If you must strain when having a bowel movement, hold gentle pressure to the puncture site.    Anesthesia or sedation  information:  You have received medication for your procedure that made you sleepy:  -- You may be sleepy, feel less coordinated or feel weak.  To prevent injury, be careful when you walk, bathe, cook or use electrical devices/tools.  -- You may NOT drive or operate mechanical equipment until 24 hours after your procedure.  You also must stop taking narcotic pain medications before driving.  -- A responsible adult should remain with you for at least 24 hours after your procedure.  You should stay at home and rest today.  -- This may cause you to react differently to alcohol.  Do not drink alcohol for at least 24 hours after your procedure.  -- This may cause you to not think clearly.  Do not make important decisions for 24 hours after your procedure.  -- To prevent burns, do not smoke.    Local anesthesia:  -- You had local anesthesia (numbing medicine) for your procedure.  The numbness should go away a few hours after the procedure.    Your medicines:  -- It is important that you take the medicines on your list.  Work with your health care provider or pharmacist if you have questions about your medicine.    Return to work:  -- You can return to work in 24 hours if your work does not stop you from following your care instructions (such as lifting).  If your work does not allow you to follow the instructions, you should return to work in 48 hours.     Diet    Follow this diet upon discharge: Regular       Patient was discussed with the attending physician, Dr. Kervin Boateng MD  ESN fellow  Pager: [phone]

## 2020-04-03 NOTE — PROGRESS NOTES
Social Work Services Discharge Note      Patient Name:  Rhonda Padron     Anticipated Discharge Date: 4/3/2020    Discharge Disposition:   TCU:  [facility]    Following MD:  Assigned per facility     Pre-Admission Screening (PAS) online form has been completed.  The Level of Care (LOC) is:  Determined  Confirmation Code is:  N/A, pt returning to facility  Patient/caregiver informed of referral to [facility] for Pre-Admission Screening for skilled nursing facility (SNF) placement and to expect a phone call post discharge from SNF.     Additional Services/Equipment Arranged:  Discharge orders faxed to [facility] ([phone] f.).     Patient / Family response to discharge plan:  Pt was very anxious about discharging when she had just had a nosebleed and was experiencing discomfort from her intubation during surgery. VELMA explained that pt has the right to appeal her discharge and pt denied interest in doing so. VELMA contacted pt's son Alfredo. Alfredo said he is able and willing to pick pt up and plans to come pick her up at 2. VELMA also reviewed pt's right to appeal discharge and Alfredo declined interest. He plans to come pick here up at 2 and was given the number for the 4A nursing station for any additional medical questions. He asked that pt have a mask and non-latex gloves for her trip OUT of the hospital to minimize her risk fo exposure to COVID-19. VELMA passed this along to RN staff.     Persons notified of above discharge plan:  Physician, bedside RN, Pt (in person), son Alfredo ([phone]), [facility] ([phone])    Staff Discharge Instructions:  Please fax discharge orders and signed hard scripts for any controlled substances.  Please print a packet and send with patient.     Call [facility] for Nurse-to-Nurse Handoff: [phone]    CTS Handoff completed:  NO    Medicare Notice of Rights provided to the patient/family:  YES      Addendum 15:15  SW alerted by bedside RN  that pt was not able to discharge today due to a nosebleed. RN spoke with pt's son and facility. Unfortunately pt will likely now need to discharge on Monday instead because [facility] does not accept weekend admissions.    CANDY Almanzar, [facility]  ICU    [facility]   P: [phone]  Pager: [phone]

## 2020-04-04 VITALS
DIASTOLIC BLOOD PRESSURE: 77 MMHG | RESPIRATION RATE: 24 BRPM | OXYGEN SATURATION: 96 % | HEIGHT: 66 IN | TEMPERATURE: 97.7 F | HEART RATE: 65 BPM | BODY MASS INDEX: 21.29 KG/M2 | SYSTOLIC BLOOD PRESSURE: 149 MMHG | WEIGHT: 132.5 LBS

## 2020-04-04 LAB
HCT VFR BLD AUTO: 30 % (ref 35–47)
HGB BLD-MCNC: 9.4 G/DL (ref 11.7–15.7)
PLATELET # BLD AUTO: 142 10E9/L (ref 150–450)

## 2020-04-04 PROCEDURE — 85027 COMPLETE CBC AUTOMATED: CPT | Performed by: RADIOLOGY

## 2020-04-04 PROCEDURE — 36415 COLL VENOUS BLD VENIPUNCTURE: CPT | Performed by: RADIOLOGY

## 2020-04-04 PROCEDURE — 25000132 ZZH RX MED GY IP 250 OP 250 PS 637: Mod: GY | Performed by: STUDENT IN AN ORGANIZED HEALTH CARE EDUCATION/TRAINING PROGRAM

## 2020-04-04 PROCEDURE — 25000128 H RX IP 250 OP 636: Performed by: STUDENT IN AN ORGANIZED HEALTH CARE EDUCATION/TRAINING PROGRAM

## 2020-04-04 RX ORDER — HYDROCODONE BITARTRATE AND ACETAMINOPHEN 5; 325 MG/1; MG/1
0.5 TABLET ORAL EVERY 6 HOURS PRN
Qty: 10 TABLET | Refills: 0 | Status: SHIPPED | OUTPATIENT
Start: 2020-04-04

## 2020-04-04 RX ORDER — OXYMETAZOLINE HYDROCHLORIDE 0.05 G/100ML
3 SPRAY NASAL EVERY 12 HOURS PRN
DISCHARGE
Start: 2020-04-04

## 2020-04-04 RX ADMIN — LOSARTAN POTASSIUM 100 MG: 100 TABLET, FILM COATED ORAL at 08:39

## 2020-04-04 RX ADMIN — METOPROLOL TARTRATE 50 MG: 50 TABLET, FILM COATED ORAL at 10:10

## 2020-04-04 RX ADMIN — PREDNISOLONE ACETATE 1 DROP: 10 SUSPENSION/ DROPS OPHTHALMIC at 08:34

## 2020-04-04 RX ADMIN — LABETALOL 20 MG/4 ML (5 MG/ML) INTRAVENOUS SYRINGE 10 MG: at 12:07

## 2020-04-04 RX ADMIN — OXYMETAZOLINE HYDROCHLORIDE 3 SPRAY: 0.05 SPRAY NASAL at 08:33

## 2020-04-04 RX ADMIN — LATANOPROST 1 DROP: 50 SOLUTION OPHTHALMIC at 08:34

## 2020-04-04 RX ADMIN — DORZOLAMIDE HYDROCHLORIDE AND TIMOLOL MALEATE 1 DROP: 20; 5 SOLUTION/ DROPS OPHTHALMIC at 08:34

## 2020-04-04 RX ADMIN — Medication 1 TABLET: at 08:34

## 2020-04-04 RX ADMIN — TICAGRELOR 90 MG: 90 TABLET ORAL at 08:39

## 2020-04-04 RX ADMIN — GABAPENTIN 100 MG: 100 CAPSULE ORAL at 08:34

## 2020-04-04 RX ADMIN — SERTRALINE HYDROCHLORIDE 50 MG: 50 TABLET ORAL at 08:33

## 2020-04-04 RX ADMIN — ATROPINE SULFATE 1 DROP: 10 SOLUTION/ DROPS OPHTHALMIC at 08:34

## 2020-04-04 ASSESSMENT — ACTIVITIES OF DAILY LIVING (ADL)
ADLS_ACUITY_SCORE: 22

## 2020-04-04 ASSESSMENT — MIFFLIN-ST. JEOR: SCORE: 1022.75

## 2020-04-04 NOTE — DISCHARGE SUMMARY
[facility], [place]    Endovascular Surgical Neuroradiology Discharge Summary    Date of Admission: 4/2/2020  Date of Discharge: 04/04/2020    Disposition: Discharged to nursing home  Primary Care Physician: Marivel Tamayo      Admission Diagnosis:   Carotid cavernous fistula     Discharge Diagnosis:   Carotid cavernous fistula     Problem Leading to Hospitalization (from \[facility]\""):   Rhonda Padron is a 89 year old female with a diagnosis of right carotid cavernous fistula . She underwent a cerebral angiogram in [place] and is here for further management.  She has a history of fall last October after which she initially noticed the symptoms with gradual decreased visual equity in the left eye.  This has since progressed.  There is swelling over her left eye.  Her right initially had good vision however it has deteriorated significantly over the past couple of months.  She is independent at baseline and was able to do all her activities up until a few weeks earlier.  Currently she has visual symptoms apart from that is neurologically intact.  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 She was admitted for urgent treatment of right indirect carotid cavernous fistula.     Brief Hospital Course:   Patient was admitted to neurointensive care unit for overnight observation after placement of flow diverting stent in the right internal carotid artery across the fistulous point. The cavernous sinus could not be accessed through trans venous approach. She had an uneventful course except she experienced mild epistaxis at the time of eptifibatide drip transition to Brilinta. After Afrin nasal drops her epistaxis has resolved.Patient was tolerating oral diet and was ambulating with support. Her groin access site was dry and intact without any evidence of hematoma or tenderness. She was provided detailed explanation about her treatment and follow-up plan. She  was discharged home in stable condition.     Complications: None.     PHYSICAL EXAMINATION  Vital Signs:  B/P: 137/70, T: 97.7, P: 65, R: 24    Alert, oriented to time place and person.  Speech fluent with normal naming, repetition, comprehension. Congestion in right eye has improved. Able to perceive hand movements with left eye.  Facial sensation, movement normal.  Palate moves symmetrically.  Tongue midline.  Sternocleidomastoid and trapezius strength intact.  Motor 5/5 in all four extremitis, sensation intact to touch bilaterally without any neglect.  No dysmetria noted.  Gait deferred.        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      CONTINUE these medications which have NOT CHANGED  Details  acetaminophen (TYLENOL) 500 MG tablet Take 500 mg by mouth every 6 hours as needed for mild pain    aspirin (ASA) 81 MG EC tablet Take 81 mg by mouth At Bedtime    atropine 1 % ophthalmic solution Place 1 drop Into the left eye 2 times daily    calcium carbonate-vitamin D 600-200 MG-UNIT CAPS Take 1 capsule by mouth daily    dorzolamide-timolol (COSOPT) 2-0.5 % ophthalmic solution Place 1 drop Into the left eye 2 times daily    gabapentin (NEURONTIN) 100 MG capsule Take 100 mg by mouth daily CURRENTLY ON HOLD STARTING 4/2/2020    HYDROcodone-acetaminophen (NORCO) 5-325 MG tablet Take 0.5 tablets by mouth every 6 hours as needed for severe pain    latanoprost (XALATAN) 0.005 % ophthalmic solution Place 1 drop Into the left eye 2 times daily    losartan (COZAAR) 100 MG tablet Take 100 mg by mouth daily CURRENTLY ON HOLD STARTING  4/2/2020    metoprolol tartrate (LOPRESSOR) 50 MG tablet Take 50 mg by mouth 2 times daily    pravastatin (PRAVACHOL) 20 MG tablet Take 20 mg by mouth At Bedtime    prednisoLONE acetate (PRED FORTE) 1 % ophthalmic suspension Place 1 drop Into the left eye 2 times daily    sertraline (ZOLOFT) 50 MG tablet Take 50 mg by mouth daily    artificial saliva (BIOTENE MT) SOLN solution Swish and spit 1 spray in mouth every 6 hours as needed for dry mouth    polyethylene glycol (MIRALAX) packet Take 17 g by mouth daily as needed for constipation          Additional recommendations and follow up:      Reason for your hospital stay  You were admitted for observation after having being treated for a right carotid cavernous fistula with flow diverting stent.    Adult [facility] Follow-up and recommended labs and tests  Follow up with Dr. Galeana , at (location with clinic name or city) [facility] clinic, within 1month  to evaluate after surgery. No follow up labs or test are needed.    Appointments on [place] and/or [facility] (with [facility] or [facility] provider or service). Call [phone] if you haven't heard regarding these appointments within 7 days of discharge.    Activity  Your activity upon discharge: activity as tolerated    Discharge Instructions  What you may eat or drink after angiogram:  -- There are no changes in what you should eat or drink after this test except that you should drink at least six to eight 8-ounce glasses of fluid each day for 2 days to flush the contrast (dye) out of your body unless you are on a limited fluids diet.    Moving around after angiogram:  -- After your test: Rest 48 hours and follow the special activity instructions listed.    Special activity restrictions:  -- Limit physical activity for 48 hours.  Rest on a sofa or bed as much as possible.  -- No strenuous activity.  -- No long walks.  -- Avoid climbing stairs if possible.  If you must climb stairs, do  it slowly.    Lifting:  -- Do not lift more than 10 pounds for 48 hours.   -- Do not lift more than 20 pounds for 7 days. (A full gallon of water weighs about 8 pounds)    Sexual activity:  -- You can resume sexual activity in 2 days.  Bathing/Swimming:  -- You may shower in 24 hours .  -- You may NOT take a tub bath, swim, or sit in water for 5 days.    The groin puncture site:  Care:  -- Keep the area clean and dry except for during daily shower.  -- Clean the site daily using soap and water while standing in the shower.  Pat dry thoroughly.  -- Cover the groin site with a bandaid until the skin has closed.   -- When you sneeze, cough or laugh, hold pressure on the puncture site.  -- If you must strain when having a bowel movement, hold gentle pressure to the puncture site.    Anesthesia or sedation information:  You have received medication for your procedure that made you sleepy:  -- You may be sleepy, feel less coordinated or feel weak.  To prevent injury, be careful when you walk, bathe, cook or use electrical devices/tools.  -- You may NOT drive or operate mechanical equipment until 24 hours after your procedure.  You also must stop taking narcotic pain medications before driving.  -- A responsible adult should remain with you for at least 24 hours after your procedure.  You should stay at home and rest today.  -- This may cause you to react differently to alcohol.  Do not drink alcohol for at least 24 hours after your procedure.  -- This may cause you to not think clearly.  Do not make important decisions for 24 hours after your procedure.  -- To prevent burns, do not smoke.    Local anesthesia:  -- You had local anesthesia (numbing medicine) for your procedure.  The numbness should go away a few hours after the procedure.    Your medicines:  -- It is important that you take the medicines on your list.  Work with your health care provider or pharmacist if you have questions about your medicine.    Return to  work:  -- You can return to work in 24 hours if your work does not stop you from following your care instructions (such as lifting).  If your work does not allow you to follow the instructions, you should return to work in 48 hours.    Diet  Follow this diet upon discharge: Regular      Aspirin 81 mg and Brilinta 90 mg BID for atleast 3 months. Aspirin 81 mg life long. 3 month follow up angiogram.     Patient was discussed with the attending physician, Dr. Galeana.    Timothy yDson  Neuro Interventional fellow  [id number]        I agree with the above note by           on  4/4/2020

## 2020-04-04 NOTE — PLAN OF CARE
Major Shift Events:  No epistaxis overnight. Prn labetalol given x1 for sbp >160. Cont c/o L eye discomfort. States vision is slowly improving but is still blurry. Groin sites ecchymotic, otherwise CDI. Up with sba and walker.    Plan: ? Discharge vs transfer to floor  For vital signs and complete assessments, please see documentation flowsheets.

## 2020-04-04 NOTE — PROGRESS NOTES
Social Work Services Progress Note    Hospital Day: 3  Collaborated with: Aura- Nurse [facility] facility, Alfredo- Pt's son, Jazmine- bedside nurse    Data:  Pt is a 89 year old female admitted to [facility] on 4/2/2020.    Intervention:  VELMA paged by bedside nurse confirming pt is medically ready for discharge and requested SW follow up with facility to confirm able to accept today.    VELMA spoke with Aura at [facility] who confirmed able to accept pt as long as pt is able to get to facility before 4pm. SW confirmed the information.    VELMA phoned out to Alfredo, pt's son, to confirm transport and ETA. Per Alfredo his brother will transport pt back to her facility and will arrive to the hospital by 1330.    VELMA updated bedside nurse of transport time and provided number for nurse to nurse report to be complete. Aura at [facility] updated pt's ETA.    Orders faxed to facility.    Assessment:  Pt ready for discharge    Plan:    Anticipated Disposition:  Facility:  [facility]    Barriers to d/c plan:  NA    Follow Up:  SW will continue to follow for discharge needs.    Lynda Tyson [facility]  Weekend SW  Pager: [phone]  On Call Pager- [phone] 4pm-Midnight

## 2020-04-04 NOTE — PLAN OF CARE
S:  Pt discharged from  to nursing home now.  B:  Pt discharged via wheelchair to front door of hospital where her son picked her up to transport to her nursing home.        Full report given to nursing home staff including that pt continues to have decreased vision in Left eye and left eye has both scleral edema and hemorrhage in the eye.  Pt states she thinks her vision is improving in the left eye but is unable to describe her vision.  Does follow eoms but not to [place].          Denies HA or nausea.  Taking po well.        No nasal bleeding or sensation of bleeding down the back of the throat per pt.        Ambulated x 3 in room to  using walker w/o difficulty.        All belongings sent w/ pt including coat, shoes, sock and glasses which she had on.  Her other belongings were picked up by her son and brought to her nursing home.       Social work faxed transfer orders to nursing home.  Reviewed discharge orders with pt and pt given copy of discharge paperwork.        See critical care flow sheet for remainder of assessment.    A: As noted.  R:  Pt discharged to her nursing home.

## 2020-04-09 NOTE — PROCEDURES
[id number]   Rhonda Padron   1/27/1927  89 year old and woman    Indication: Symptomatic carotid cavernous fistula     Brief History: Rhonda Padron is a 89 year old female with a diagnosis of right carotid cavernous fistula . She underwent a cerebral angiogram in [place] and is here for further management.  She has a history of fall last October after which she initially noticed the symptoms with gradual decreased visual equity in the left eye.  This has since progressed.  There is swelling over her left eye.  Her right initially had good vision however it has deteriorated significantly over the past couple of months.  She is independent at baseline and was able to do all her activities up until a few weeks earlier.  Currently she has visual symptoms apart from that is neurologically intact.  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 She was admitted for urgent treatment of right indirect carotid cavernous fistula.      : Darron Galeana MD  Hoffman: Timothy Dyson MD, Masood Boateng MD, Alfonso Rush MD    Anesthesia: General  Fluoroscopy time (minutes): 289.3  Radiation dose (mGy): 3519    Contrast amount (mL): 200   Other medications: Heparin 100 U/ Kg bolus followed by 1000 U every hour for the duration of the procedure, Eptifibatide 8 mg bolus followed by 1mcg/kg/min infusion    Procedure:   1. Diagnostic cerebral angiography and interpretation of the images.  2. Ultrasound guided left common femoral arteriotomy and bilateral femoral venotomy with permanent image of the anatomy stored in the electronic medical record.  3. 3D rotational angiography of right internal carotid artery with reconstruction and interpretation of images on a separate workstation  4. Selective catheterization and diagnostic cerebral angiography of the right internal jugular vein, left internal jugular vein, left inferior petrosal sinus, right internal carotid artery, left internal  carotid artery, right external carotid artery, left external carotid artery    5. Placement of a flow diverting stent in the cavernous-petrous segment of right internal carotid artery.     6. Diagnostic angiography of the left common femoral artery.   7. Percutaneous closure left femoral arteriotomy using 6F starclose device, right femoral venotomy using 6F starclose device, left femoral venotomy using 6F exoseal device.    Consent: The risks, benefits of a conventional diagnostic cerebral angiography with treatment of carotid cavernous fistula with trans arterial, trans venous, and percutaneous approach were discussed with the patient and her son who agreed to proceed. The risks, which were discussed included risk of stroke, arterial dissection in the neck, groin hematoma, arteriovenous fistula in the groin and pseudoaneurysm of the femoral artery. Subsequently, verbal and written informed consent was obtained.    Technique/findings: The patient was brought to the angiography suite and placed in supine position. The medications were administered by the radiology nursing staff. The nursing staff independently monitored the patient's vital signs during the procedure.    The patient's bilateral groin were prepped and draped in standard fashion. The left common femoral artery was palpated. Ultrasound was used to image the common femoral artery. The femoral artery was shown to be patent. Lidocaine was injected locally and a small skin incision was made over the femoral artery using a scalpel. The subcutaneous tissue was dissected using a Vanesa clamp. Using real-time ultrasound guidance, a 19 gauge needle was placed into the right femoral artery which was exchanged for a 6 Grenadian sheath over a J wire. The sheath was connected to a continuous flush of heparinized saline. A hard copy was stored in the patient's record.  Similarly under ultrasound guidance, bilateral femoral venotomies were performed and bilateral 6 Grenadian  femoral access were gained.  We initially performed transarterial diagnostic angiogram using 5 Vincentian glide Conway 2 diagnostic catheter.  A power injector assisted 3D rotational angiography of right internal carotid artery was performed.  The obtained images were processed and reconstructed on a separate workstation.  Subsequently we initially plan to approach the fistula through transvenous route.  A 6 Vincentian 100 cm Envoy MPD guide catheter was advanced over a 5 Vincentian 125 cm Vert diagnostic catheter and 0.035 inch Glidewire.  The Envoy guide catheter was advanced into the proximal right internal jugular vein over the Glidewire and diagnostic catheter.  Subsequently we attempted to catheterize the inferior petrosal sinus, however, a clear channel to the petrosal sinus was not identified.  We also attempted to catheterize the facial vein for retrograde access to the angular vein and superior ophthalmic vein, however, due to significant septations we were not able to catheterize the facial vein.  We attempted to catheterize the facial vein using various combinations of Glidewire, Traxcess 14 microwire, Road Runner 035 wire with Headway duo 167 micro catheter and Maxine 5-125 cm distal access catheter. Subsequently, we attempted to catheterize left inferior petrosal sinus. The 6F Envoy guide catheter was advanced into the left Internal jugular vein over the glide wire and 5-125 cm Vert diagnostic catheter. Subsequently we advanced the Sofial distal access catheter over 035 wire to the origin of left inferior petrosal sinus channel. We were able to navigate headway duo 167 microcatheter to the mid segment of inferior petrosal sinus using combination of micro wires including Traxcess 14, Syncro Soft, Synchro 10 and Asahi 008 microwire.  The microcatheter could not be further advanced into the cavernous sinus.  A microcatheter injection confirmed cross-filling with visualization of hypoplastic contralateral inferior  petrosal sinus and drainage through the facial vein.  At this point we attempted to utilize the roadmap from the contralateral injection and advanced a 5 [place] Islander diagnostic catheter into the facial vein along with a 6 Solomon Islander 95 cm Benchmark guide catheter.  Again, due to a significant tortuosity the diagnostic catheter and microcatheter could not be advanced up to the angular vein.  At this point we advanced a 6 [place] Islander benchmark guide catheter into the right internal carotid artery over a 5 Solomon Islander 125 cm Mendez diagnostic catheter and 0.0 three 5 inch angled Glidewire.  A headway 27 microcatheter was advanced over a Traxcess 14 microwire into the ophthalmic segment of the right internal carotid artery. We were able to select the right ophthalmic artery and selective ophthalmic artery injection showed orbital drainage through angular and facial vein.  At this point we re-attempted to catheterize the facial vein through the sheath present in the right internal jugular vein, however, we were unsuccessful.  Subsequently, we attempted to catheterize the meningohypophyseal trunk branch feeding to the carotid cavernous fistula.  We were able to advance the microwire into the meningohypophyseal trunk feeder and exchanged the headway 27 microcatheter for a headway duo 167 microcatheter while keeping the tip of the microwire in the same position.  The headway duo 167 microcatheter also could not be advanced into the feeder.  At this point considering patient's age and high perioperative risk, we proceeded with plan of placing a flow diverting stent across the meningohypophyseal trunk feeder.  The Headway 27 microcatheter was advanced into the ophthalmic segment over a Synchro standard microwire.  After removing the microwire, a 5*21*14 Irving flowdiverting stent was deployed in the proximal cavernous and petrous segment across the fistulous point.  Follow-up right internal carotid artery injection showed a slight decrease in  the flow of fistula.  Subsequently the guide catheters were removed.  Left femoral arteriotomy was closed using 6 Tanzanian Star close device, right femoral venotomy was closed using 6 Tanzanian Star close device, and left femoral venotomy was closed using 6 Tanzanian XO seal device with immediate hemostasis.  Patient was extubated without any neurological deficits and transferred to ICU.     Findings:    Series 1 shows power injector assisted 3D rotational angiography of right internal carotid artery.  There is visualization of normal cervical, petrous, clinoid, ophthalmic, and communicating segment of the right internal carotid artery.  The right internal carotid artery bifurcates into right anterior and middle cerebral artery.  Right anterior cerebral artery A1 segment is hypoplastic.  The proximal and distal segments of right anterior and middle cerebral artery are normal.  Flash filling of the left anterior cerebral artery is also noted.  Contrast reflux also opacifies right external carotid artery and its branches which are normal.  The cavernous segment of the right internal carotid artery is abnormal at the level of posterior Janumet.  There is visualization of an indirect carotid cavernous fistula which is fed by branches of meningohypophyseal trunk.  The venous drainage of the fistula is mainly through contralateral cavernous sinus and left superior ophthalmic vein.    Series 2 shows right internal carotid artery injection in standard AP and lateral projections.  Findings described above are unchanged.  The capillary and venous phase of brain parenchyma involving the right anterior circulation is normal.    Series 3 shows right external carotid artery injection cranial view.  Right external carotid artery injection on AP and lateral projection shows normal internal maxillary, middle meningeal, superficial temporal, and occipital artery.  The right external carotid artery branches does not have any supply to  above-noted carotid cavernous fistula.    Series 4 shows left external carotid artery injection cranial view.  Left external carotid artery injection on AP and lateral projection shows normal internal maxillary, middle meningeal, superficial temporal, and occipital artery.  The left external carotid artery branches does not have any supply to above-noted carotid cavernous fistula.    Series 5 shows left internal carotid artery injection.  The left internal carotid artery injection on standard AP and lateral projection shows normal distal cervical, petrous, cavernous, clinoid, ophthalmic, and communicating segment of the left internal carotid artery.  There is visualization of a 2 mm posterior communicating artery infundibulum.  The left internal carotid artery bifurcates into left anterior and middle cerebral artery.  The left A1 segment is dominant compared to the right A1 segment and supplies bilateral anterior cerebral artery A2 and distal segments.  The proximal and distal segments of left middle cerebral artery are normal.  The capillary and venous phases are normal.    Right internal jugular vein injection: Series 7-15 shows right internal jugular vein injections for initial attempt of catheterization right inferior petrosal sinus and facial vein.  The injection at the level of the jugular bulb does not opacify inferior petrosal sinus well.  The facial vein origin is noted, however it is septated and the angular vein could not be traced.  Otherwise normal anatomy of right transverse sigmoid junction including condylar vein and paravertebral venous plexus is noted.    Left internal jugular vein injections: Series 16 through 32 shows left internal jugular vein injections and microcatheter injections from inferior petrosal sinus.  The injection at the level of jugular bulb does faintly opacify inferior petrosal sinus.  The facial vein origin is noted, however, it could not be traced to the angular vein.   Otherwise normal anatomy of left transverse sigmoid junction including condylar veins and paravertebral venous plexus is also noted.  Series 32 shows microcatheter injection while the tip of the microcatheter is in the mid segment of the inferior petrosal sinus.  There is visualization of marginal sinus, bilateral cavernous sinuses, and contralateral hypoplastic inferior petrosal sinus.  The contralateral petrosal sinus drainage is noted through the venous channel which ultimately emerges to right internal jugular vein.    Series 41 shows selective right ophthalmic artery injection.  There is normal visualization of ophthalmic artery and its branches including central retinal artery, ciliary artery, anterior falcine artery, and ethmoidal branches to the nasal mucosa.  The venous drainage of orbit is through the angular vein.  There is no opacification of superior orbital vein or cavernous sinus through this injection.    Series 50 and 51 shows right internal carotid artery injection after placement of flow diverting stent across the fistulous of the carotid cavernous fistula.  The visualized distal cervical, petrous, clinoid, ophthalmic, and communicating segments of the right internal carotid artery are normal.  The stent extending from the posterior genu of the right internal carotid artery to the vertical petrous segment is well opposed to the vessel wall and covers branches of meningohypophyseal trunk.  There is still evidence of early venous shunting through the cavernous sinus, however, the opacification of the left superior ophthalmic vein has notably slowed down.  The right internal carotid artery bifurcates into right anterior and middle cerebral artery.  The proximal and distal segments of the right anterior middle cerebral artery are unchanged compared to baseline run with normal capillary and venous phase of brain parenchyma.    Dr. Galeana was present for the entire procedure.      Impression:   1.  Type B right carotid cavernous fistula treated with placement of a flow diverting stent in the right internal carotid artery across the meningohypophysial feeders after initial unsuccessful attempt of getting trans venous access at the fistulous point. The fistula is only fed by meningo hypophysial trunk branches with early venous drainage to left superior ophthalmic vein via cavernous system.  2. 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    3. Post stent placement injections show a slight delay in filling of left superior ophthalmic vein.       Plan: 3 month Aspirin 81 daily and Brilinta 90 mg BID. 3 month follow up angiogram if patient agrees.     ALONZO Osborne  Endovascular Surgical Neuroradiology Fellow  Pager: [phone]

## 2020-04-15 ENCOUNTER — TELEPHONE (OUTPATIENT)
Dept: NEUROSURGERY | Facility: CLINIC | Age: 85
End: 2020-04-15

## 2020-04-15 NOTE — TELEPHONE ENCOUNTER
[facility] Call Center    Phone Message    May a detailed message be left on voicemail: yes     Reason for Call: Other: Alfredo calling on behalf of his mother Rhonda to schedule an appointment with Dr. Galeana. Rhonda had surgery on 4/2/20, and they were told they would need to make a follow up appointment with him. Please give Alfredo a call back at your earliest convenience to discuss.     Action Taken: Message routed to:  [facility]  Neuro Surg    Travel Screening: Not Applicable

## 2020-05-05 ENCOUNTER — VIRTUAL VISIT (OUTPATIENT)
Dept: NEUROSURGERY | Facility: CLINIC | Age: 85
End: 2020-05-05
Payer: MEDICARE

## 2020-05-05 ENCOUNTER — TELEPHONE (OUTPATIENT)
Dept: NEUROSURGERY | Facility: CLINIC | Age: 85
End: 2020-05-05

## 2020-05-05 DIAGNOSIS — I67.1 DURAL ARTERIOVENOUS FISTULA: Primary | ICD-10-CM

## 2020-05-05 NOTE — TELEPHONE ENCOUNTER
[facility] Call Center    Phone Message    May a detailed message be left on voicemail: yes     Reason for Call: Other: Alfredo calling on behalf of his mother Cody to request that she be scheduled for a new appointment with Dr. Galeana. Alfredo says that Cody is living at the [facility] in room 808, and the team there can be reached at [phone]. Alfredo says they will take care of the scheduling to make sure there is staff available at that time. Alfredo was not sure why the appointment today (5/5/20) did not happen. Alfredo would like to request that Cody's nursing home is called to schedule the appointment, and then he be called ot be informed of when the appointment is. Please give Alfredo a call back if there are any questions.     Action Taken: Message routed to:  [facility]  Neuro surg    Travel Screening: Not Applicable

## 2020-05-12 ENCOUNTER — VIRTUAL VISIT (OUTPATIENT)
Dept: NEUROSURGERY | Facility: CLINIC | Age: 85
End: 2020-05-12
Payer: MEDICARE

## 2020-05-12 DIAGNOSIS — I67.1 DURAL ARTERIOVENOUS FISTULA: Primary | ICD-10-CM

## 2020-05-13 ENCOUNTER — TELEPHONE (OUTPATIENT)
Dept: RADIOLOGY | Facility: CLINIC | Age: 85
End: 2020-05-13

## 2020-05-13 NOTE — TELEPHONE ENCOUNTER
[facility] Call Center    Phone Message    May a detailed message be left on voicemail: yes     Reason for Call: Other: Jackson would like an update on how the visit went with pt, please call to discuss.      Action Taken: Message routed to:  [facility] Neurosurgery     Travel Screening: Not Applicable

## 2020-05-14 NOTE — PROGRESS NOTES
Ms. Padron is a 89-year-old woman who was found to have symptomatic [place] Type B carotid cavernous fistula that was initially investigated by Dr. Kimble in [place] and subsequently she was referred to [place] for further management due to her ongoing decline in vision.  During her hospitalization in April 1 week she was treated with placement of a flow diverting stent across the cavernous segment of the right internal carotid artery after initial attempts to catheterize the inferior petrosal sinus and superior ophthalmic vein were not successful.  After placement of a flow diverting stent, patient had described some improvement in symptoms with less pressure behind the right.  Since discharge, she has been following up with ophthalmology.  At this point she is not able to see anything through her left eye and has some vision retained on the right side.  She denies of any headache or retro-orbital pain, however, complains of intermittent pain at the groin access site which is slowly getting better.  We independently spoke with patient and her son to discuss the follow-up plans.  It would be ideal to obtain a follow-up cerebral angiogram, however, patient would not like to proceed with any further invasive procedures.  Respecting patient's wishes, we also discussed with her son Jackson that unless she complains of headache or retro-orbital pain, it would be reasonable to continue watching her clinically.  Meanwhile, if patient requires any emergent attention from neuro interventional service, she can be brought to [facility] or she can be evaluated at [facility] with Dr. Kimble.  The son expressed good understanding of the plan moving forward. We will continue aspirin and Brilinta for at least 6 months.    Plan : 6-month follow-up phone encounter.    Dr. Galeana was present for the entire phone encounter.    We spent approximately 30 minutes on phone and 45 minutes for chart review and chart  completion.    Timothy Dyson  [id number]    I agree with th note by Dr. Dyson dated 5/12/2020

## 2020-05-14 NOTE — TELEPHONE ENCOUNTER
Dr. Dyson will contact patients son. Moira Gomez RN 5/14/2020 2:21 PM     See 5/12 visit encounter for follow-up. Moira Gomez RN 5/15/2020 8:03 AM

## (undated) RX ORDER — LIDOCAINE HYDROCHLORIDE 20 MG/ML
INJECTION, SOLUTION EPIDURAL; INFILTRATION; INTRACAUDAL; PERINEURAL
Status: DISPENSED
Start: 2020-04-02

## (undated) RX ORDER — FENTANYL CITRATE 50 UG/ML
INJECTION, SOLUTION INTRAMUSCULAR; INTRAVENOUS
Status: DISPENSED
Start: 2020-04-02

## (undated) RX ORDER — PHENYLEPHRINE HCL IN 0.9% NACL 1 MG/10 ML
SYRINGE (ML) INTRAVENOUS
Status: DISPENSED
Start: 2020-04-02

## (undated) RX ORDER — HEPARIN SODIUM 1000 [USP'U]/ML
INJECTION, SOLUTION INTRAVENOUS; SUBCUTANEOUS
Status: DISPENSED
Start: 2020-04-02

## (undated) RX ORDER — EPHEDRINE SULFATE 50 MG/ML
INJECTION, SOLUTION INTRAMUSCULAR; INTRAVENOUS; SUBCUTANEOUS
Status: DISPENSED
Start: 2020-04-02

## (undated) RX ORDER — EPTIFIBATIDE 2 MG/ML
INJECTION, SOLUTION INTRAVENOUS
Status: DISPENSED
Start: 2020-04-02

## (undated) RX ORDER — LIDOCAINE HYDROCHLORIDE 10 MG/ML
INJECTION, SOLUTION EPIDURAL; INFILTRATION; INTRACAUDAL; PERINEURAL
Status: DISPENSED
Start: 2020-04-02

## (undated) RX ORDER — PROPOFOL 10 MG/ML
INJECTION, EMULSION INTRAVENOUS
Status: DISPENSED
Start: 2020-04-02